# Patient Record
Sex: FEMALE | Race: OTHER | Employment: UNEMPLOYED | URBAN - METROPOLITAN AREA
[De-identification: names, ages, dates, MRNs, and addresses within clinical notes are randomized per-mention and may not be internally consistent; named-entity substitution may affect disease eponyms.]

---

## 2017-02-06 ENCOUNTER — INITIAL PRENATAL (OUTPATIENT)
Dept: MIDWIFE SERVICES | Age: 32
End: 2017-02-06

## 2017-02-06 ENCOUNTER — TELEPHONE (OUTPATIENT)
Dept: MIDWIFE SERVICES | Age: 32
End: 2017-02-06

## 2017-02-06 VITALS
WEIGHT: 187 LBS | TEMPERATURE: 98 F | BODY MASS INDEX: 34.41 KG/M2 | DIASTOLIC BLOOD PRESSURE: 79 MMHG | HEART RATE: 79 BPM | SYSTOLIC BLOOD PRESSURE: 124 MMHG | HEIGHT: 62 IN

## 2017-02-06 DIAGNOSIS — O09.892 SUPERVISION OF OTHER HIGH RISK PREGNANCY, ANTEPARTUM, SECOND TRIMESTER: Primary | ICD-10-CM

## 2017-02-06 DIAGNOSIS — Z98.891 HX OF CESAREAN SECTION: ICD-10-CM

## 2017-02-06 DIAGNOSIS — Z3A.13 13 WEEKS GESTATION OF PREGNANCY: ICD-10-CM

## 2017-02-06 PROBLEM — Q61.3 KIDNEY POLYCYSTIC DISEASE: Status: ACTIVE | Noted: 2017-02-06

## 2017-02-06 PROBLEM — O09.899 SUPERVISION OF OTHER HIGH RISK PREGNANCY, ANTEPARTUM: Status: ACTIVE | Noted: 2017-02-06

## 2017-02-06 LAB
BILIRUB UR QL STRIP: NEGATIVE
GLUCOSE UR-MCNC: NEGATIVE MG/DL
HCG URINE, QL. (POC): POSITIVE
KETONES P FAST UR STRIP-MCNC: NEGATIVE MG/DL
PH UR STRIP: 6 [PH] (ref 4.6–8)
PROT UR QL STRIP: NEGATIVE MG/DL
SP GR UR STRIP: 1.02 (ref 1–1.03)
UA UROBILINOGEN AMB POC: NORMAL (ref 0.2–1)
URINALYSIS CLARITY POC: CLEAR
URINALYSIS COLOR POC: YELLOW
URINE BLOOD POC: NORMAL
URINE LEUKOCYTES POC: NEGATIVE
URINE NITRITES POC: NEGATIVE
VALID INTERNAL CONTROL?: YES

## 2017-02-06 NOTE — TELEPHONE ENCOUNTER
Pt was going to buy a 936IYM folic acid to take but the pharmacist informed her that her prenatal vitamin already has 1mg of folic acid in it. Pt advised that should be adequate.

## 2017-02-06 NOTE — TELEPHONE ENCOUNTER
Pt calling stating the pharmacist stated the amount of folic acid pt is being told to take is too much and she would like to discuss it. Please call.

## 2017-02-06 NOTE — PROGRESS NOTES
Chief Complaint   Patient presents with    Initial Prenatal Visit     Visit Vitals    /79    Pulse 79    Temp 98 °F (36.7 °C) (Oral)    Ht 5' 2\" (1.575 m)    Wt 187 lb (84.8 kg)    Breastfeeding No    BMI 34.2 kg/m2     1. Have you been to the ER, urgent care clinic since your last visit? Hospitalized since your last visit? No    2. Have you seen or consulted any other health care providers outside of the [address] [place] [address] since your last visit? Include any pap smears or colon screening.  No    Verbal order per Dr Megan Ascencio for urine pregnancy, urine dip, urine culture & G/C.

## 2017-02-06 NOTE — PROGRESS NOTES
RETURN OB VISIT SUMMARY    Subjective:    28 y.o.    13w1d with has no unusual complaints, Denies LOF, dysuria, bleeding or cramping. Reports normal fetal movement. She istolerating her diet and is taking PNV on a regular basis    Objective:    Physical Exam:  Visit Vitals    /79    Pulse 79    Temp 98 °F (36.7 °C) (Oral)    Ht 5' 2\" (1.575 m)    Wt 187 lb (84.8 kg)    LMP 2016    Breastfeeding No    BMI 34.2 kg/m2     Also, see prenatal vitals flowsheet. Patient without distress. Fundus: soft and non tender    28 wk Labs:  deferred  Lab Results   Component Value Date/Time    HGB 12.3 2015 11:01 AM    HCT 37.6 2015 11:01 AM    PLATELET 527  11:01 AM    ABO Group O 2015 11:01 AM    Rh (D) Positive 2015 11:01 AM    Antibody screen Negative 2015 11:01 AM    Chlamydia trachomatis, BRADLEY Negative 2015 10:20 AM    Neisseria gonorrhoeae, BRADLEY Negative 2015 10:20 AM    Hep B surface Ag screen Negative 2015 11:01 AM       Immunization History   Administered Date(s) Administered    Influenza Vaccine (Quad) 2015       Flu Shot: Pregnancy will continue into the flu season. Flu shot: declined    Assessment/Plan:    Linda Mays was seen today for initial prenatal visit.     Diagnoses and all orders for this visit:    Supervision of other high risk pregnancy, antepartum, second trimester  -     HEP B SURFACE AG  -     T PALLIDUM SCREEN W/REFLEX  -     RUBELLA AB, IGG  -     TYPE & SCREEN  -     HIV 1/2 AG/AB, 4TH GENERATION,W RFLX CONFIRM  -     CBC WITH AUTOMATED DIFF  -     THYROID CASCADE PROFILE  -     CYSTIC FIBROSIS MUTATION 97  -     VITAMIN D, 25 HYDROXY  -     HEMOGLOBIN FRACTIONATION  -     CMV AB, IGG  -     CMV AB, IGM  -     AMB POC URINE PREGNANCY TEST, VISUAL COLOR COMPARISON  -     AMB POC URINALYSIS DIP STICK MANUAL W/O MICRO  -     CULTURE, URINE  -     CHLAMYDIA / GC AMPLIFICATION    Hx of  section    13 weeks gestation of pregnancy    Other orders  -     prenatal vit-calcium-iron-fa (PRENATAL PLUS WITH CALCIUM) 27 mg iron- 1 mg tab; Take 1 Tab by mouth daily. Follow-up Disposition:  Return in about 4 weeks (around 3/6/2017) for HROB for, previous C/S and poor ob hx. OB Report [facility] Page 1/2 [facility]  Patient / Exam Information Date of Exam: 2017  Name: Audra Hanna. Phys: Carlo GUY  Patient ID: [id number] : 1985 Ref. Phys:  2nd Pat. ID: Age: 28 Sonographer: Carlo GUY  Indication: Sex: Female Exam Type: DATING  LMP: 2016  DOC:  GA(LMP) 13w1d MIQUEL(LMP) 2017 Grav: 4 Para: 1 Ab: 2 Ect.:  GA(AUA) 12w4d MIQUEL(AUA) 2017  2D Measurements AUA Value m1 m2 m3 Meth. GP GA  CRL ([place]) 5.95 cm 6.03 5.98 5.85 avg. 7.3% 12w4d  Doppler Measurements Value m1 m2 m3 m4 m5 m6 Meth. A Fetal Heart Rate   bpm 161 157 160 avg. IMPRESSION  SIUP consistent with dates, positive Fetal cardiac activity 159 bpm  Recommendations  Rescan as clinically indicated  Date: 2017 Perf.  Physician: Carlo GUY Sonographer: Carlo GUY

## 2017-02-06 NOTE — MR AVS SNAPSHOT
Visit Information Date & Time Provider Department Dept. Phone Encounter #  
 2017 10:00 AM Erica Chavez, [address] [facility] [phone] [id number] Upcoming Health Maintenance Date Due INFLUENZA AGE 9 TO ADULT 2016 PAP AKA CERVICAL CYTOLOGY 2018 Allergies as of 2017  Review Complete On: 2017 By: Erica Chavez MD  
 No Known Allergies Current Immunizations  Never Reviewed Name Date Influenza Vaccine Tone Ram) 2015 Not reviewed this visit You Were Diagnosed With   
  
 Codes Comments Supervision of other high risk pregnancy, antepartum, second trimester    -  Primary ICD-10-CM: G64.536 ICD-9-CM: V23.89 Hx of  section     ICD-10-CM: H69.852 ICD-9-CM: V45.89 Vitals BP Pulse Temp Height(growth percentile) Weight(growth percentile) LMP  
 124/79 79 98 °F (36.7 °C) (Oral) 5' 2\" (1.575 m) 187 lb (84.8 kg) 2016 Breastfeeding? BMI OB Status Smoking Status No 34.2 kg/m2 Having regular periods Never Smoker BMI and BSA Data Body Mass Index Body Surface Area  
 34.2 kg/m 2 1.93 m 2 Preferred Pharmacy Pharmacy Name Phone [facility] [address] [facility] [phone] Your Updated Medication List  
  
   
This list is accurate as of: 17 11:02 AM.  Always use your most recent med list.  
  
  
  
  
 prenatal multivit-ca-min-fe-fa Tab Take  by mouth.  
  
 prenatal vit-calcium-iron-fa 27 mg iron- 1 mg Tab Commonly known as:  PRENATAL PLUS with CALCIUM Take 1 Tab by mouth daily. Prescriptions Sent to Pharmacy Refills  
 prenatal vit-calcium-iron-fa (PRENATAL PLUS WITH CALCIUM) 27 mg iron- 1 mg tab 4 Sig: Take 1 Tab by mouth daily. Class: Normal  
 [facility] [address] [facility] #: [phone] Route: Oral  
  
We Performed the Following CBC WITH AUTOMATED DIFF [17776 CPT(R)] CMV AB, IGG [08164 CPT(R)] CMV AB, IGM [18223 CPT(R)] CYSTIC FIBROSIS MUTATION 97 [[id number] Custom] HEMOGLOBIN FRACTIONATION [[id number] Custom] HEP B SURFACE AG T0464428 CPT(R)] HIV 1/2 AG/AB, 4TH GENERATION,W RFLX CONFIRM [[id number] Custom] RUBELLA AB, IGG B0273661 CPT(R)] T PALLIDUM SCREEN W/REFLEX [[id number] Custom] THYROID CASCADE PROFILE [[id number] Custom] TYPE & SCREEN [[id number] Custom] Comments:  
 ENTER SURGERY DATE IF FOR PRE-OP TESTING. VITAMIN D, 25 HYDROXY G3669222 CPT(R)] Introducing [facility] SERVICES! Nicolasa Pedroza introduces [employer] patient portal. Now you can access parts of your medical record, email your doctor's office, and request medication refills online. 1. In your internet browser, go to [email or web address] 2. Click on the First Time User? Click Here link in the Sign In box. You will see the New Member Sign Up page. 3. Enter your [employer] Access Code exactly as it appears below. You will not need to use this code after youve completed the sign-up process. If you do not sign up before the expiration date, you must request a new code. · [employer] Access Code: [email or web address] Expires: 5/7/2017 10:51 AM 
 
4. Enter the last four digits of your Social Security Number (xxxx) and Date of Birth (mm/dd/yyyy) as indicated and click Submit. You will be taken to the next sign-up page. 5. Create a [employer] ID. This will be your [employer] login ID and cannot be changed, so think of one that is secure and easy to remember. 6. Create a [employer] password. You can change your password at any time. 7. Enter your Password Reset Question and Answer. This can be used at a later time if you forget your password. 8. Enter your e-mail address. You will receive e-mail notification when new information is available in [address] 9. Click Sign Up. You can now view and download portions of your medical record. 10. Click the Download Summary menu link to download a portable copy of your medical information. If you have questions, please visit the Frequently Asked Questions section of the [employer] website. Remember, [employer] is NOT to be used for urgent needs. For medical emergencies, dial 911. Now available from your iPhone and Android! Please provide this summary of care documentation to your next provider. Your primary care clinician is listed as MARINA TURCIOS. If you have any questions after today's visit, please call [phone].

## 2017-02-07 LAB — BACTERIA UR CULT: NO GROWTH

## 2017-02-08 LAB
C TRACH RRNA SPEC QL NAA+PROBE: NEGATIVE
CHLAMYDIA, EXTERNAL: NEGATIVE
N GONORRHOEA RRNA SPEC QL NAA+PROBE: NEGATIVE
N. GONORRHEA, EXTERNAL: NEGATIVE

## 2017-02-09 ENCOUNTER — HOSPITAL ENCOUNTER (OUTPATIENT)
Dept: PERINATAL CARE | Age: 32
Discharge: HOME | End: 2017-02-09
Payer: COMMERCIAL

## 2017-02-09 PROCEDURE — 76801 OB US < 14 WKS SINGLE FETUS: CPT | Performed by: OBSTETRICS & GYNECOLOGY

## 2017-02-09 PROCEDURE — 76813 OB US NUCHAL MEAS 1 GEST: CPT | Performed by: OBSTETRICS & GYNECOLOGY

## 2017-02-09 PROCEDURE — 36416 COLLJ CAPILLARY BLOOD SPEC: CPT | Performed by: OBSTETRICS & GYNECOLOGY

## 2017-02-14 ENCOUNTER — TELEPHONE (OUTPATIENT)
Dept: PERINATAL CARE | Age: 32
End: 2017-02-14

## 2017-02-15 LAB
25(OH)D3+25(OH)D2 SERPL-MCNC: 26.7 NG/ML (ref 30–100)
ABO GROUP BLD: NORMAL
BASOPHILS # BLD AUTO: 0 X10E3/UL (ref 0–0.2)
BASOPHILS NFR BLD AUTO: 0 %
BLD GP AB SCN SERPL QL: NEGATIVE
CFTR MUT ANL BLD/T: NORMAL
CMV IGG SERPL IA-ACNC: 5.7 U/ML (ref 0–0.59)
CMV IGM SERPL IA-ACNC: <30 AU/ML (ref 0–29.9)
EOSINOPHIL # BLD AUTO: 0.1 X10E3/UL (ref 0–0.4)
EOSINOPHIL NFR BLD AUTO: 1 %
ERYTHROCYTE [DISTWIDTH] IN BLOOD BY AUTOMATED COUNT: 14.4 % (ref 12.3–15.4)
GENE DIS ANL CARRIER INTERP-IMP: NORMAL
HBSAG, EXTERNAL: NEGATIVE
HBV SURFACE AG SERPL QL IA: NEGATIVE
HCT VFR BLD AUTO: 35.2 % (ref 34–46.6)
HGB A MFR BLD: 56.9 % (ref 94–98)
HGB A2 MFR BLD COLUMN CHROM: 4 % (ref 0.7–3.1)
HGB BLD-MCNC: 11.7 G/DL (ref 11.1–15.9)
HGB C MFR BLD: 0 %
HGB F MFR BLD: 1 % (ref 0–2)
HGB FRACT BLD-IMP: ABNORMAL
HGB S BLD QL SOLY: POSITIVE
HGB S MFR BLD: 38.1 %
HIV 1+2 AB+HIV1 P24 AG SERPL QL IA: NON REACTIVE
HIV, EXTERNAL: NONREACTIVE
IMM GRANULOCYTES # BLD: 0 X10E3/UL (ref 0–0.1)
IMM GRANULOCYTES NFR BLD: 0 %
LYMPHOCYTES # BLD AUTO: 1.6 X10E3/UL (ref 0.7–3.1)
LYMPHOCYTES NFR BLD AUTO: 18 %
MCH RBC QN AUTO: 28.8 PG (ref 26.6–33)
MCHC RBC AUTO-ENTMCNC: 33.2 G/DL (ref 31.5–35.7)
MCV RBC AUTO: 87 FL (ref 79–97)
MONOCYTES # BLD AUTO: 0.3 X10E3/UL (ref 0.1–0.9)
MONOCYTES NFR BLD AUTO: 4 %
NEUTROPHILS # BLD AUTO: 6.7 X10E3/UL (ref 1.4–7)
NEUTROPHILS NFR BLD AUTO: 77 %
PLATELET # BLD AUTO: 304 X10E3/UL (ref 150–379)
RBC # BLD AUTO: 4.06 X10E6/UL (ref 3.77–5.28)
RH BLD: POSITIVE
RUBELLA, EXTERNAL: NORMAL
RUBV IGG SERPL IA-ACNC: 2.38 INDEX
T PALLIDUM AB SER QL IA: NEGATIVE
T. PALLIDUM, EXTERNAL: NEGATIVE
TSH SERPL DL<=0.005 MIU/L-ACNC: 0.68 UIU/ML (ref 0.45–4.5)
TYPE, ABO & RH, EXTERNAL: NORMAL
WBC # BLD AUTO: 8.7 X10E3/UL (ref 3.4–10.8)

## 2017-02-28 NOTE — PROGRESS NOTES
Sickle cell trait  FOB needs testing (Hgb fractionation) - can probably be tested at [facility] if no insurance

## 2017-02-28 NOTE — PROGRESS NOTES
Recommendations: 1. Consider ONTD screening (MSAFP) during the second trimester, if the patient desires. 2. Preliminary fetal anatomy survey in about 4 weeks. Ms. Jose Ferrari will consider genetic counseling at that time. 3. Recommend a detailed fetal anatomy scan at 19-20 weeks.

## 2017-03-01 NOTE — PROGRESS NOTES
Unable to reach patient by phone. i see an appointment for this Monday, so we can discuss results at that time.

## 2017-03-06 ENCOUNTER — ROUTINE PRENATAL (OUTPATIENT)
Dept: MIDWIFE SERVICES | Age: 32
End: 2017-03-06

## 2017-03-06 VITALS
DIASTOLIC BLOOD PRESSURE: 82 MMHG | SYSTOLIC BLOOD PRESSURE: 123 MMHG | WEIGHT: 185 LBS | HEIGHT: 62 IN | HEART RATE: 93 BPM | BODY MASS INDEX: 34.04 KG/M2

## 2017-03-06 DIAGNOSIS — Q61.3 KIDNEY POLYCYSTIC DISEASE: ICD-10-CM

## 2017-03-06 DIAGNOSIS — Z3A.17 17 WEEKS GESTATION OF PREGNANCY: ICD-10-CM

## 2017-03-06 DIAGNOSIS — O09.892 SUPERVISION OF OTHER HIGH RISK PREGNANCY, ANTEPARTUM, SECOND TRIMESTER: Primary | ICD-10-CM

## 2017-03-06 DIAGNOSIS — Z98.891 HX OF CESAREAN SECTION: ICD-10-CM

## 2017-03-06 NOTE — PROGRESS NOTES
Chief Complaint   Patient presents with    Routine Prenatal Visit     17w1d     Visit Vitals    /82    Pulse 93    Ht 5' 2\" (1.575 m)    Wt 185 lb (83.9 kg)    BMI 33.84 kg/m2       1. Have you been to the ER, urgent care clinic since your last visit? Hospitalized since your last visit? No    2. Have you seen or consulted any other health care providers outside of the [address] [place] [address] since your last visit? Include any pap smears or colon screening. No     Here today for a routine prenatal visit and she has no complaints or concerns.

## 2017-03-06 NOTE — PROGRESS NOTES
RETURN OB VISIT SUMMARY    Subjective:    28 y.o.    17w1d with has no unusual complaints, Denies LOF, dysuria, bleeding or cramping. Reports normal fetal movement. She istolerating her diet and is taking PNV on a regular basis. She has added Vit D supplement. Need to send all labs to [facility] due to patient's insurance  Objective:    Physical Exam:  Visit Vitals    /82    Pulse 93    Ht 5' 2\" (1.575 m)    Wt 185 lb (83.9 kg)    LMP 2016    BMI 33.84 kg/m2     Also, see prenatal vitals flowsheet. Patient without distress. Fundus: soft and non tender    28 wk Labs:  discussed  Lab Results   Component Value Date/Time    HGB 11.7 2017 11:17 AM    HCT 35.2 2017 11:17 AM    PLATELET 603  11:17 AM    ABO Group O 2017 11:17 AM    Rh (D) Positive 2017 11:17 AM    Antibody screen Negative 2017 11:17 AM    Chlamydia trachomatis, BRADLEY Negative 2017 11:53 AM    Neisseria gonorrhoeae, BRADLEY Negative 2017 11:53 AM    Hep B surface Ag screen Negative 2017 11:17 AM    HIV SCREEN 4TH GENERATION WRFX Non Reactive 2017 11:17 AM       Immunization History   Administered Date(s) Administered    Influenza Vaccine (Quad) 2015       Flu Shot: Pregnancy will continue into the flu season. Flu shot: declined and due to late in the flu season    Assessment/Plan:    Jorge Maher was seen today for routine prenatal visit.     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  FOB previously tested negative for SS trait. Family's first child has trait.

## 2017-03-06 NOTE — MR AVS SNAPSHOT
Visit Information Date & Time Provider Department Dept. Phone Encounter #  
 3/6/2017 10:40 AM Guillermina Sutton MD [place] [facility] [address] [id number] Upcoming Health Maintenance Date Due INFLUENZA AGE 9 TO ADULT 2016 PAP AKA CERVICAL CYTOLOGY 2018 Allergies as of 3/6/2017  Review Complete On: 3/6/2017 By: Guillermina Sutton MD  
 No Known Allergies Current Immunizations  Never Reviewed Name Date Influenza Vaccine Rajielvia Mora) 2015 Not reviewed this visit You Were Diagnosed With   
  
 Codes Comments Supervision of other high risk pregnancy, antepartum, second trimester    -  Primary ICD-10-CM: F39.319 ICD-9-CM: V23.89 Hx of  section     ICD-10-CM: N11.587 ICD-9-CM: V45.89 Kidney polycystic disease     ICD-10-CM: Q61.3 ICD-9-CM: 753.12   
 17 weeks gestation of pregnancy     ICD-10-CM: Z3A.17 
ICD-9-CM: V22.2 Vitals BP Pulse Height(growth percentile) Weight(growth percentile) LMP BMI  
 123/82 93 5' 2\" (1.575 m) 185 lb (83.9 kg) 2016 33.84 kg/m2 OB Status Smoking Status Having regular periods Never Smoker Vitals History BMI and BSA Data Body Mass Index Body Surface Area  
 33.84 kg/m 2 1.92 m 2 Preferred Pharmacy Pharmacy Name Phone [facility] [address] [facility] [phone] Your Updated Medication List  
  
   
This list is accurate as of: 3/6/17 11:22 AM.  Always use your most recent med list.  
  
  
  
  
 prenatal vit-calcium-iron-fa 27 mg iron- 1 mg Tab Commonly known as:  PRENATAL PLUS with CALCIUM Take 1 Tab by mouth daily. We Performed the Following   
 AFP, MATERNAL SCREEN [39630 CPT(R)] To-Do List   
 2017 10:15 AM  
  Appointment with ULTRASOUND 3 SFM at [facility] ([phone]) [facility] [place] [facility] SERVICES! Romayne Duster introduces [employer] patient portal. Now you can access parts of your medical record, email your doctor's office, and request medication refills online. 1. In your internet browser, go to [email or web address] 2. Click on the First Time User? Click Here link in the Sign In box. You will see the New Member Sign Up page. 3. Enter your [employer] Access Code exactly as it appears below. You will not need to use this code after youve completed the sign-up process. If you do not sign up before the expiration date, you must request a new code. · [employer] Access Code: [email or web address] Expires: 5/7/2017 10:51 AM 
 
4. Enter the last four digits of your Social Security Number (xxxx) and Date of Birth (mm/dd/yyyy) as indicated and click Submit. You will be taken to the next sign-up page. 5. Create a [employer] ID. This will be your [employer] login ID and cannot be changed, so think of one that is secure and easy to remember. 6. Create a [employer] password. You can change your password at any time. 7. Enter your Password Reset Question and Answer. This can be used at a later time if you forget your password. 8. Enter your e-mail address. You will receive e-mail notification when new information is available in [address] 9. Click Sign Up. You can now view and download portions of your medical record. 10. Click the Download Summary menu link to download a portable copy of your medical information. If you have questions, please visit the Frequently Asked Questions section of the [employer] website. Remember, [employer] is NOT to be used for urgent needs. For medical emergencies, dial 911. Now available from your iPhone and Android! Please provide this summary of care documentation to your next provider. Your primary care clinician is listed as Christie Martin. If you have any questions after today's visit, please call [phone].

## 2017-03-07 ENCOUNTER — HOSPITAL ENCOUNTER (OUTPATIENT)
Dept: PERINATAL CARE | Age: 32
Discharge: HOME | End: 2017-03-07
Attending: OBSTETRICS & GYNECOLOGY
Payer: COMMERCIAL

## 2017-03-07 PROCEDURE — 76816 OB US FOLLOW-UP PER FETUS: CPT | Performed by: OBSTETRICS & GYNECOLOGY

## 2017-03-20 LAB
AFP, SERUM, 141303: NORMAL
Lab: NORMAL
MOM AFP, 7036: 0.77

## 2017-04-03 ENCOUNTER — ROUTINE PRENATAL (OUTPATIENT)
Dept: MIDWIFE SERVICES | Age: 32
End: 2017-04-03

## 2017-04-03 VITALS
SYSTOLIC BLOOD PRESSURE: 119 MMHG | HEART RATE: 84 BPM | HEIGHT: 62 IN | WEIGHT: 185.5 LBS | DIASTOLIC BLOOD PRESSURE: 72 MMHG | BODY MASS INDEX: 34.14 KG/M2

## 2017-04-03 DIAGNOSIS — Z3A.21 21 WEEKS GESTATION OF PREGNANCY: ICD-10-CM

## 2017-04-03 DIAGNOSIS — O09.892 SUPERVISION OF OTHER HIGH RISK PREGNANCY, ANTEPARTUM, SECOND TRIMESTER: Primary | ICD-10-CM

## 2017-04-03 DIAGNOSIS — Z98.891 HX OF CESAREAN SECTION: ICD-10-CM

## 2017-04-03 RX ORDER — CHOLECALCIFEROL (VITAMIN D3) 125 MCG
CAPSULE ORAL
COMMUNITY

## 2017-04-03 NOTE — PROGRESS NOTES
Chief Complaint   Patient presents with    Routine Prenatal Visit     21w 1d     Visit Vitals    /72    Pulse 84    Ht 5' 2\" (1.575 m)    Wt 185 lb 8 oz (84.1 kg)    LMP 11/06/2016    BMI 33.93 kg/m2     1. Have you been to the ER, urgent care clinic since your last visit? Hospitalized since your last visit? No    2. Have you seen or consulted any other health care providers outside of the [address] [place] [address] since your last visit? Include any pap smears or colon screening.  No

## 2017-04-03 NOTE — MR AVS SNAPSHOT
Visit Information Date & Time Provider Department Dept. Phone Encounter #  
 4/3/2017 10:40 AM Tiny Coleman MD [address] [facility] [phone] [id number] Follow-up Instructions Return in about 4 weeks (around 2017) for HROB for, previous C/S. Upcoming Health Maintenance Date Due INFLUENZA AGE 9 TO ADULT 2016 PAP AKA CERVICAL CYTOLOGY 2018 Allergies as of 4/3/2017  Review Complete On: 4/3/2017 By: Tiny Coleman MD  
 No Known Allergies Current Immunizations  Never Reviewed Name Date Influenza Vaccine Levester Bloch) 2015 Not reviewed this visit You Were Diagnosed With   
  
 Codes Comments Supervision of other high risk pregnancy, antepartum, second trimester    -  Primary ICD-10-CM: X45.072 ICD-9-CM: V23.89 Hx of  section     ICD-10-CM: S28.211 ICD-9-CM: V45.89   
 21 weeks gestation of pregnancy     ICD-10-CM: Z3A.21 
ICD-9-CM: V22.2 Vitals BP Pulse Height(growth percentile) Weight(growth percentile) LMP BMI  
 119/72 84 5' 2\" (1.575 m) 185 lb 8 oz (84.1 kg) 2016 33.93 kg/m2 OB Status Smoking Status Having regular periods Never Smoker BMI and BSA Data Body Mass Index Body Surface Area  
 33.93 kg/m 2 1.92 m 2 Preferred Pharmacy Pharmacy Name Phone [facility] [address] [place] [address] [phone] Your Updated Medication List  
  
   
This list is accurate as of: 4/3/17 11:12 AM.  Always use your most recent med list.  
  
  
  
  
 prenatal vit-calcium-iron-fa 27 mg iron- 1 mg Tab Commonly known as:  PRENATAL PLUS with CALCIUM Take 1 Tab by mouth daily. VITAMIN D3 2,000 unit Tab Generic drug:  cholecalciferol (vitamin D3) Take  by mouth. Follow-up Instructions Return in about 4 weeks (around 2017) for HROB for, previous C/S. To-Do List   
 2017 10:15 AM  
 Appointment with ULTRASOUND 3 SFM at [facility] ([phone]) Introducing [facility] & HEALTH SERVICES! Radha Nix introduces [employer] patient portal. Now you can access parts of your medical record, email your doctor's office, and request medication refills online. 1. In your internet browser, go to [email or web address] 2. Click on the First Time User? Click Here link in the Sign In box. You will see the New Member Sign Up page. 3. Enter your Headspace Access Code exactly as it appears below. You will not need to use this code after youve completed the sign-up process. If you do not sign up before the expiration date, you must request a new code. · Headspace Access Code: [email or web address] Expires: 5/7/2017 11:51 AM 
 
4. Enter the last four digits of your Social Security Number (xxxx) and Date of Birth (mm/dd/yyyy) as indicated and click Submit. You will be taken to the next sign-up page. 5. Create a Headspace ID. This will be your Headspace login ID and cannot be changed, so think of one that is secure and easy to remember. 6. Create a Headspace password. You can change your password at any time. 7. Enter your Password Reset Question and Answer. This can be used at a later time if you forget your password. 8. Enter your e-mail address. You will receive e-mail notification when new information is available in [address] 9. Click Sign Up. You can now view and download portions of your medical record. 10. Click the Download Summary menu link to download a portable copy of your medical information. If you have questions, please visit the Frequently Asked Questions section of the Head[employer] website. Remember, Head[employer] is NOT to be used for urgent needs. For medical emergencies, dial 911. Now available from your iPhone and Android! Please provide this summary of care documentation to your next provider. Your primary care clinician is listed as Gita Cartagena. If you have any questions after today's visit, please call [phone].

## 2017-04-03 NOTE — PROGRESS NOTES
RETURN OB VISIT SUMMARY    Subjective:    28 y.o.    21w1d with has no unusual complaints, Denies LOF, dysuria, bleeding or cramping. Reports normal fetal movement. She istolerating her diet and is taking PNV on a regular basis. We discussed US results and her questions were answered  Objective:    Physical Exam:  Visit Vitals    /72    Pulse 84    Ht 5' 2\" (1.575 m)    Wt 185 lb 8 oz (84.1 kg)    LMP 2016    BMI 33.93 kg/m2     Also, see prenatal vitals flowsheet. Patient without distress. Fundus: soft and non tender    28 wk Labs:  discussed  Lab Results   Component Value Date/Time    HGB 11.7 2017 11:17 AM    HCT 35.2 2017 11:17 AM    PLATELET 898 52/10/8310 11:17 AM    ABO Group O 2017 11:17 AM    Rh (D) Positive 2017 11:17 AM    Antibody screen Negative 2017 11:17 AM    Chlamydia trachomatis, BRADLEY Negative 2017 11:53 AM    Neisseria gonorrhoeae, BRADLEY Negative 2017 11:53 AM    Hep B surface Ag screen Negative 2017 11:17 AM    HIV SCREEN 4TH GENERATION WRFX Non Reactive 2017 11:17 AM       Immunization History   Administered Date(s) Administered    Influenza Vaccine (Quad) 2015       Flu Shot: Pregnancy will not continue into the flu season. Flu shot: discussed    Assessment/Plan:    Zan Fountain was seen today for routine prenatal visit. Diagnoses and all orders for this visit:    Supervision of other high risk pregnancy, antepartum, second trimester    Hx of  section    21 weeks gestation of pregnancy      Follow-up Disposition:  Return in about 4 weeks (around 2017) for HROB for, previous C/S.

## 2017-04-04 ENCOUNTER — HOSPITAL ENCOUNTER (OUTPATIENT)
Dept: PERINATAL CARE | Age: 32
Discharge: HOME | End: 2017-04-04
Attending: OBSTETRICS & GYNECOLOGY
Payer: COMMERCIAL

## 2017-04-04 PROCEDURE — 76811 OB US DETAILED SNGL FETUS: CPT | Performed by: OBSTETRICS & GYNECOLOGY

## 2017-05-01 ENCOUNTER — ROUTINE PRENATAL (OUTPATIENT)
Dept: MIDWIFE SERVICES | Age: 32
End: 2017-05-01

## 2017-05-01 VITALS
SYSTOLIC BLOOD PRESSURE: 106 MMHG | HEART RATE: 85 BPM | BODY MASS INDEX: 34.6 KG/M2 | WEIGHT: 188 LBS | HEIGHT: 62 IN | DIASTOLIC BLOOD PRESSURE: 64 MMHG

## 2017-05-01 DIAGNOSIS — Z3A.25 25 WEEKS GESTATION OF PREGNANCY: ICD-10-CM

## 2017-05-01 DIAGNOSIS — O09.892 SUPERVISION OF OTHER HIGH RISK PREGNANCY, ANTEPARTUM, SECOND TRIMESTER: Primary | ICD-10-CM

## 2017-05-01 DIAGNOSIS — Z98.891 HX OF CESAREAN SECTION: ICD-10-CM

## 2017-05-01 NOTE — PROGRESS NOTES
Chief Complaint   Patient presents with    Routine Prenatal Visit     25w1d     Visit Vitals    /64    Pulse 85    Ht 5' 2\" (1.575 m)    Wt 188 lb (85.3 kg)    BMI 34.39 kg/m2       1. Have you been to the ER, urgent care clinic since your last visit? Hospitalized since your last visit? No    2. Have you seen or consulted any other health care providers outside of the [address] [place] [address] since your last visit? Include any pap smears or colon screening. No     Here today for a routine prenatal visit and she has no complaints or concerns.

## 2017-05-01 NOTE — PROGRESS NOTES
RETURN OB VISIT SUMMARY    Subjective:    28 y.o.    25w1d with has no unusual complaints, Denies LOF, dysuria, bleeding or cramping. Reports normal fetal movement. She istolerating her diet and is taking PNV on a regular basis. She has not noted a change in fetal position. Has repeat scan for renal follow up of fetus due to prior pregnancy with polycystic kidney disease    Objective:    Physical Exam:  Visit Vitals    /64    Pulse 85    Ht 5' 2\" (1.575 m)    Wt 188 lb (85.3 kg)    LMP 2016    BMI 34.39 kg/m2     Also, see prenatal vitals flowsheet. Patient without distress. Fundus: soft and non tender    28 wk Labs:  given  Lab Results   Component Value Date/Time    HGB 11.7 2017 11:17 AM    HCT 35.2 2017 11:17 AM    PLATELET 388  11:17 AM    ABO Group O 2017 11:17 AM    Rh (D) Positive 2017 11:17 AM    Antibody screen Negative 2017 11:17 AM    Chlamydia trachomatis, BRADLEY Negative 2017 11:53 AM    Neisseria gonorrhoeae, BRADLEY Negative 2017 11:53 AM    Hep B surface Ag screen Negative 2017 11:17 AM    HIV SCREEN 4TH GENERATION WRFX Non Reactive 2017 11:17 AM       Immunization History   Administered Date(s) Administered    Influenza Vaccine (Quad) 2015       Assessment/Plan:    Zan Fountain was seen today for routine prenatal visit. Diagnoses and all orders for this visit:    Supervision of other high risk pregnancy, antepartum, second trimester  -     Cancel: HEMOGLOBIN  -     Cancel: HEMATOCRIT  -     Cancel: PLATELET  -     Cancel: ANTIBODY SCREEN  -     Cancel: GESTATIONAL ([address] SCRN  -     HEMOGLOBIN  -     HEMATOCRIT  -     PLATELET  -     ANTIBODY SCREEN  -     GESTATIONAL (GLUCOSE)DIAB. SCRN    Hx of  section    25 weeks gestation of pregnancy      Follow-up Disposition:  Return in about 3 weeks (around 2017) for HROB for, previous C/S.

## 2017-05-02 ENCOUNTER — HOSPITAL ENCOUNTER (OUTPATIENT)
Dept: PERINATAL CARE | Age: 32
Discharge: HOME | End: 2017-05-02
Attending: OBSTETRICS & GYNECOLOGY
Payer: COMMERCIAL

## 2017-05-02 PROCEDURE — 76816 OB US FOLLOW-UP PER FETUS: CPT | Performed by: OBSTETRICS & GYNECOLOGY

## 2017-05-22 ENCOUNTER — ROUTINE PRENATAL (OUTPATIENT)
Dept: MIDWIFE SERVICES | Age: 32
End: 2017-05-22

## 2017-05-22 VITALS
BODY MASS INDEX: 34.96 KG/M2 | SYSTOLIC BLOOD PRESSURE: 109 MMHG | HEART RATE: 88 BPM | WEIGHT: 190 LBS | HEIGHT: 62 IN | DIASTOLIC BLOOD PRESSURE: 70 MMHG

## 2017-05-22 DIAGNOSIS — Z3A.28 28 WEEKS GESTATION OF PREGNANCY: Primary | ICD-10-CM

## 2017-05-22 DIAGNOSIS — Z98.891 HX OF CESAREAN SECTION: ICD-10-CM

## 2017-05-22 DIAGNOSIS — O09.893 SUPERVISION OF OTHER HIGH RISK PREGNANCY, ANTEPARTUM, THIRD TRIMESTER: ICD-10-CM

## 2017-05-22 DIAGNOSIS — R89.9 ABNORMAL LABORATORY TEST RESULT: ICD-10-CM

## 2017-05-22 NOTE — PROGRESS NOTES
Chief Complaint   Patient presents with    Routine Prenatal Visit     28w1d     Visit Vitals    /70    Pulse 88    Ht 5' 2\" (1.575 m)    Wt 190 lb (86.2 kg)    BMI 34.75 kg/m2       1. Have you been to the ER, urgent care clinic since your last visit? Hospitalized since your last visit? No    2. Have you seen or consulted any other health care providers outside of the [facility] since your last visit? Include any pap smears or colon screening. No     Here today for a routine prenatal visit and she has no complaints or concerns.

## 2017-05-22 NOTE — PROGRESS NOTES
RETURN OB VISIT SUMMARY    Subjective:    28 y.o.    28w1d with has no unusual complaints, Denies LOF, dysuria, bleeding or cramping. Reports normal fetal movement. She istolerating her diet and is taking PNV on a regular basis. She has not noted a change in fetal position. Objective:    Physical Exam:  Visit Vitals    /70    Pulse 88    Ht 5' 2\" (1.575 m)    Wt 190 lb (86.2 kg)    LMP 2016    BMI 34.75 kg/m2     Also, see prenatal vitals flowsheet. Patient without distress. Fundus: soft and non tender    Lab Results   Component Value Date/Time    HGB 11.7 2017 11:17 AM    HCT 35.2 2017 11:17 AM    PLATELET 233  11:17 AM    ABO Group O 2017 11:17 AM    Rh (D) Positive 2017 11:17 AM    Antibody screen Negative 2017 11:17 AM    Chlamydia trachomatis, BRADLEY Negative 2017 11:53 AM    Neisseria gonorrhoeae, BRADLEY Negative 2017 11:53 AM    Hep B surface Ag screen Negative 2017 11:17 AM    HIV SCREEN 4TH GENERATION WRFX Non Reactive 2017 11:17 AM    Hgb, External 10.6 2017    Hct, External 31.6 2017    Platelet cnt., External 270 2017    Antibody screen, External no antibodies detected 2017       Immunization History   Administered Date(s) Administered    Influenza Vaccine (Quad) 2015     Flu Shot given  Tdap 28-32 wks accepted and will give next visit    Assessment/Plan:    Lacey Olivares was seen today for routine prenatal visit. Diagnoses and all orders for this visit:    28 weeks gestation of pregnancy    Supervision of other high risk pregnancy, antepartum, third trimester  -     GLUCOSE, GESTATIONAL, 3 HR TOLERANCE    Abnormal laboratory test result  -     GLUCOSE, GESTATIONAL, 3 HR TOLERANCE    Hx of  section      Follow-up Disposition:  Return in about 2 weeks (around 2017) for HROB for, previous C/S.

## 2017-05-22 NOTE — PROGRESS NOTES
RETURN OB VISIT SUMMARY    Subjective:    28 y.o.    28w1d with has no unusual complaints, Denies LOF, dysuria, bleeding or cramping. Reports normal fetal movement. She is tolerating her diet and is taking PNV on a regular basis. She has not noted a change in fetal position. Objective:    Physical Exam:  Visit Vitals    /70    Pulse 88    Ht 5' 2\" (1.575 m)    Wt 190 lb (86.2 kg)    LMP 2016    BMI 34.75 kg/m2     Also, see prenatal vitals flowsheet. Patient without distress. Heart: Regular rate and rhythm, S1S2 present or without murmur or extra heart sounds  Lung: clear to auscultation throughout lung fields, no wheezes, no rales, no rhonchi and normal respiratory effort  Abdomen: soft, nontender, FHT 150s  Fundus: soft and non tender, 29cm  Ext: no calf tenderness or palpable cords    Lab Results   Component Value Date/Time    HGB 11.7 2017 11:17 AM    HCT 35.2 2017 11:17 AM    PLATELET 231  11:17 AM    ABO Group O 2017 11:17 AM    Rh (D) Positive 2017 11:17 AM    Antibody screen Negative 2017 11:17 AM    Chlamydia trachomatis, BRADLEY Negative 2017 11:53 AM    Neisseria gonorrhoeae, BRADLEY Negative 2017 11:53 AM    Hep B surface Ag screen Negative 2017 11:17 AM    HIV SCREEN 4TH GENERATION WRFX Non Reactive 2017 11:17 AM    Hgb, External 10.6 2017    Hct, External 31.6 2017    Platelet cnt., External 270 2017    Antibody screen, External no antibodies detected 2017       Immunization History   Administered Date(s) Administered    Influenza Vaccine (Quad) 2015     Flu Shot out of season  Tdap 28-32 wks discussed    Lab Results   Component Value Date/Time    HGB 11.7 2017 11:17 AM    Hgb, External 10.6 2017     1 hour glucose screen: 149    Assessment/Plan:      ICD-10-CM ICD-9-CM    1. 28 weeks gestation of pregnancy Z3A.28 V22.2    2.  Supervision of other high risk pregnancy, antepartum, third trimester O09.893 V23.89 GLUCOSE, GESTATIONAL, 3 HR TOLERANCE   3. Abnormal laboratory test result R89.9 796.4 GLUCOSE, GESTATIONAL, 3 HR TOLERANCE   4.  Hx of  section Z98.891 V45.89        Needs 3 hour GTT  Needs Tdap next visit  Will start iron supplementation   F/u in 2 weeks

## 2017-05-22 NOTE — MR AVS SNAPSHOT
Visit Information Date & Time Provider Department Dept. Phone Encounter #  
 5/22/2017 11:20 AM Britney Hopkins MD [facility] [phone] [id number] Upcoming Health Maintenance Date Due DTaP/Tdap/Td series (1 - Tdap) 12/23/2007 INFLUENZA AGE 9 TO ADULT 8/1/2017 PAP AKA CERVICAL CYTOLOGY 8/17/2018 Allergies as of 5/22/2017  Review Complete On: 5/22/2017 By: Sandy Gandhi RN No Known Allergies Current Immunizations  Never Reviewed Name Date Influenza Vaccine Aliciaprieto Blair) 12/28/2015 Not reviewed this visit You Were Diagnosed With   
  
 Codes Comments Abnormal laboratory test result    -  Primary ICD-10-CM: R89.9 ICD-9-CM: 796.4 Supervision of other high risk pregnancy, antepartum, third trimester     ICD-10-CM: O09.893 ICD-9-CM: V23.89 Vitals BP Pulse Height(growth percentile) Weight(growth percentile) LMP BMI  
 109/70 88 5' 2\" (1.575 m) 190 lb (86.2 kg) 11/06/2016 34.75 kg/m2 OB Status Smoking Status Having regular periods Never Smoker BMI and BSA Data Body Mass Index Body Surface Area 34.75 kg/m 2 1.94 m 2 Preferred Pharmacy Pharmacy Name Phone [facility] [address] [facility] [phone] Your Updated Medication List  
  
   
This list is accurate as of: 5/22/17 12:16 PM.  Always use your most recent med list.  
  
  
  
  
 prenatal vit-calcium-iron-fa 27 mg iron- 1 mg Tab Commonly known as:  PRENATAL PLUS with CALCIUM Take 1 Tab by mouth daily. VITAMIN D3 2,000 unit Tab Generic drug:  cholecalciferol (vitamin D3) Take  by mouth. We Performed the Following GLUCOSE, GESTATIONAL, 3 HR TOLERANCE [51855 CPT(R)] Patient Instructions For low hemoglobin: 
1) take 3 weeks of \"Slow Fe\" iron tablets with a Vitamin C pill. 2) then take 1 week of Vitamin C pill 3) repeat 3 weeks of iron and vitamin c and 1 week vitamin C for the rest of your pregnancy For abnormal sugar: 
1) get 3 hours glucose test.  Make sure you are fasting Tdap at next visit Introducing [facility]! Tonya Breen introduces [employer] patient portal. Now you can access parts of your medical record, email your doctor's office, and request medication refills online. 1. In your internet browser, go to [email or web address] 2. Click on the First Time User? Click Here link in the Sign In box. You will see the New Member Sign Up page. 3. Enter your [employer] Shield Access Code exactly as it appears below. You will not need to use this code after youve completed the sign-up process. If you do not sign up before the expiration date, you must request a new code. · [employer] Shield Access Code: [email or web address] Expires: 8/20/2017 12:16 PM 
 
4. Enter the last four digits of your Social Security Number (xxxx) and Date of Birth (mm/dd/yyyy) as indicated and click Submit. You will be taken to the next sign-up page. 5. Create a [employer] Shield ID. This will be your [employer] Shield login ID and cannot be changed, so think of one that is secure and easy to remember. 6. Create a [employer] Shield password. You can change your password at any time. 7. Enter your Password Reset Question and Answer. This can be used at a later time if you forget your password. 8. Enter your e-mail address. You will receive e-mail notification when new information is available in [address] 9. Click Sign Up. You can now view and download portions of your medical record. 10. Click the Download Summary menu link to download a portable copy of your medical information. If you have questions, please visit the Frequently Asked Questions section of the Bee Shield website. Remember, [employer] Shield is NOT to be used for urgent needs. For medical emergencies, dial 911. Now available from your iPhone and Android! Please provide this summary of care documentation to your next provider. Your primary care clinician is listed as Alban Toro. If you have any questions after today's visit, please call [phone].

## 2017-05-22 NOTE — PATIENT INSTRUCTIONS
For low hemoglobin:  1) take 3 weeks of \"Slow Fe\" iron tablets with a Vitamin C pill.   2) then take 1 week of Vitamin C pill  3) repeat 3 weeks of iron and vitamin c and 1 week vitamin C for the rest of your pregnancy    For abnormal sugar:  1) get 3 hours glucose test.  Make sure you are fasting    Tdap at next visit

## 2017-05-23 LAB
GTT 120 MIN, EXTERNAL: 98
GTT 180 MIN, EXTERNAL: 70
GTT 60 MIN, EXTERNAL: 141
GTT, FASTING, EXTERNAL: 76

## 2017-06-05 ENCOUNTER — ROUTINE PRENATAL (OUTPATIENT)
Dept: MIDWIFE SERVICES | Age: 32
End: 2017-06-05

## 2017-06-05 VITALS
TEMPERATURE: 98 F | HEART RATE: 91 BPM | SYSTOLIC BLOOD PRESSURE: 115 MMHG | BODY MASS INDEX: 34.66 KG/M2 | DIASTOLIC BLOOD PRESSURE: 71 MMHG | WEIGHT: 189.5 LBS

## 2017-06-05 DIAGNOSIS — O09.893 SUPERVISION OF OTHER HIGH RISK PREGNANCY, ANTEPARTUM, THIRD TRIMESTER: ICD-10-CM

## 2017-06-05 DIAGNOSIS — R73.09 ABNORMAL GTT (GLUCOSE TOLERANCE TEST): ICD-10-CM

## 2017-06-05 DIAGNOSIS — Z3A.30 30 WEEKS GESTATION OF PREGNANCY: Primary | ICD-10-CM

## 2017-06-05 DIAGNOSIS — O99.013 ANEMIA IN PREGNANCY, THIRD TRIMESTER: ICD-10-CM

## 2017-06-05 DIAGNOSIS — Z98.891 HX OF CESAREAN SECTION: ICD-10-CM

## 2017-06-05 NOTE — PROGRESS NOTES
Chief Complaint   Patient presents with    Routine Prenatal Visit     30w1d     Visit Vitals    /71    Pulse 91    Temp 98 °F (36.7 °C) (Oral)    Wt 189 lb 8 oz (86 kg)    BMI 34.66 kg/m2     1. Have you been to the ER, urgent care clinic since your last visit? Hospitalized since your last visit? No    2. Have you seen or consulted any other health care providers outside of the [address] [place] [address] since your last visit? Include any pap smears or colon screening. No    After obtaining consent, and per orders of Dr Maryanna Lanes injection of TDAP given in RIGHT ARM by Burke Naylor RN. Patient instructed to remain in clini for 20 minutes afterwards and to report any adverse reaction to me immediately.  Lot: [id number] Exp: 07/08/19 [id number]

## 2017-06-05 NOTE — PATIENT INSTRUCTIONS
Weeks 30 to 32 of Your Pregnancy: Care Instructions  Your Care Instructions    You have made it to the final months of your pregnancy. By now, your baby is really starting to look like a baby, with hair and plump skin. As you enter the final weeks of pregnancy, the reality of having a baby may start to set in. This is the time to settle on a name, get your household in order, set up a safe nursery, and find quality  if needed. Doing these things in advance will allow you to focus on caring for and enjoying your new baby. You may also want to have a tour of your hospital's labor and delivery unit to get a better idea of what to expect while you are in the hospital.  During these last months, it is very important to take good care of yourself and pay attention to what your body needs. If your doctor says it is okay for you to work, don't push yourself too hard. Use the tips provided in this care sheet to ease heartburn and care for varicose veins. If you haven't already had the Tdap shot during this pregnancy, talk to your doctor about getting it. It will help protect your  against pertussis infection. Follow-up care is a key part of your treatment and safety. Be sure to make and go to all appointments, and call your doctor if you are having problems. It's also a good idea to know your test results and keep a list of the medicines you take. How can you care for yourself at home? Pay attention to your baby's movements  · You should feel your baby move several times every day. · Your baby now turns less, and kicks and jabs more. · Your baby sleeps 20 to 45 minutes at a time and is more active at certain times of day. · If your doctor wants you to count your baby's kicks:  ¨ Empty your bladder, and lie on your side or relax in a comfortable chair. ¨ Write down your start time. ¨ Pay attention only to your baby's movements. Count any movement except hiccups.   ¨ After you have counted 10 movements, write down your stop time. ¨ Write down how many minutes it took for your baby to move 10 times. ¨ If an hour goes by and you have not recorded 10 movements, have something to eat or drink and then count for another hour. If you do not record 10 movements in either hour, call your doctor. Ease heartburn  · Eat small, frequent meals. · Do not eat chocolate, peppermint, or very spicy foods. Avoid drinks with caffeine, such as coffee, tea, and sodas. · Avoid bending over or lying down after meals. · Talk a short walk after you eat. · If heartburn is a problem at night, do not eat for 2 hours before bedtime. · Take antacids like Mylanta, Maalox, Rolaids, or Tums. Do not take antacids that have sodium bicarbonate. Care for varicose veins  · Varicose veins are blood vessels that stretch out with the extra blood during pregnancy. Your legs may ache or throb. Most varicose veins will go away after the birth. · Avoid standing for long periods of time. Sit with your legs crossed at the ankles, not the knees. · Sit with your feet propped up. · Avoid tight clothing or stockings. Wear support hose. · Exercise regularly. Try walking for at least 30 minutes a day. Where can you learn more? Go to http://addison-cosme.info/. Enter I716 in the search box to learn more about \"Weeks 30 to 32 of Your Pregnancy: Care Instructions. \"  Current as of: May 30, 2016  Content Version: 11.2  © 0219-0537 [employer]. Care instructions adapted under license by [employer] (which disclaims liability or warranty for this information). If you have questions about a medical condition or this instruction, always ask your healthcare professional. [place] [address] any warranty or liability for your use of this information.          Learning About When to Call Your Doctor During Pregnancy (After 20 Weeks)  Your Care Instructions  It's common to have concerns about what might be a problem during pregnancy. Although most pregnant women don't have any serious problems, it's important to know when to call your doctor if you have certain symptoms or signs of labor. These are general suggestions. Your doctor may give you some more information about when to call. When to call your doctor (after 20 weeks)  Call 911 anytime you think you may need emergency care. For example, call if:  · You have severe vaginal bleeding. · You have sudden, severe pain in your belly. · You passed out (lost consciousness). · You have a seizure. · You see or feel the umbilical cord. · You think you are about to deliver your baby and can't make it safely to the hospital.  Call your doctor now or seek immediate medical care if:  · You have vaginal bleeding. · You have belly pain. · You have a fever. · You have symptoms of preeclampsia, such as:  ¨ Sudden swelling of your face, hands, or feet. ¨ New vision problems (such as dimness or blurring). ¨ A severe headache. · You have a sudden release of fluid from your vagina. (You think your water broke.)  · You think that you may be in labor. This means that you've had at least 4 contractions within 20 minutes or at least 8 contractions in an hour. · You notice that your baby has stopped moving or is moving much less than normal.  · You have symptoms of a urinary tract infection. These may include:  ¨ Pain or burning when you urinate. ¨ A frequent need to urinate without being able to pass much urine. ¨ Pain in the flank, which is just below the rib cage and above the waist on either side of the back. ¨ Blood in your urine. Watch closely for changes in your health, and be sure to contact your doctor if:  · You have vaginal discharge that smells bad. · You have skin changes, such as:  ¨ A rash. ¨ Itching. ¨ Yellow color to your skin. · You have other concerns about your pregnancy.   If you have labor signs at 37 weeks or more  If you have signs of labor at 37 weeks or more, your doctor may tell you to call when your labor becomes more active. Symptoms of active labor include:  · Contractions that are regular. · Contractions that are less than 5 minutes apart. · Contractions that are hard to talk through. Follow-up care is a key part of your treatment and safety. Be sure to make and go to all appointments, and call your doctor if you are having problems. It's also a good idea to know your test results and keep a list of the medicines you take. Where can you learn more? Go to http://addison-cosme.info/. Enter  in the search box to learn more about \"Learning About When to Call Your Doctor During Pregnancy (After 20 Weeks). \"  Current as of: May 30, 2016  Content Version: 11.2  © 3092-0827 [employer]. Care instructions adapted under license by [employer] (which disclaims liability or warranty for this information). If you have questions about a medical condition or this instruction, always ask your healthcare professional. [address] any warranty or liability for your use of this information.

## 2017-06-05 NOTE — PROGRESS NOTES
RETURN OB VISIT SUMMARY    Subjective:    28 y.o.    30w1d has no unusual complaints, Denies LOF, dysuria, bleeding or cramping. Reports normal fetal movement. She is tolerating her diet and is taking PNV on a regular basis. Objective:    Physical Exam:  Visit Vitals    /71    Pulse 91    Temp 98 °F (36.7 °C) (Oral)    Wt 189 lb 8 oz (86 kg)    LMP 2016    BMI 34.66 kg/m2     Also, see prenatal vitals flowsheet. Patient without distress. Heart: Regular rate and rhythm, S1S2 present or without murmur or extra heart sounds  Lung: clear to auscultation throughout lung fields, no wheezes, no rales, no rhonchi and normal respiratory effort  Abdomen: soft, nontender, FHT 32  Fundus: soft and non tender, 120-130s cm  Ext: no calf tenderness or palpable cords    Lab Results   Component Value Date/Time    HGB 11.7 2017 11:17 AM    HCT 35.2 2017 11:17 AM    PLATELET 754 2075 11:17 AM    ABO Group O 2017 11:17 AM    Rh (D) Positive 2017 11:17 AM    Antibody screen Negative 2017 11:17 AM    Chlamydia trachomatis, BRADLEY Negative 2017 11:53 AM    Neisseria gonorrhoeae, BRADLEY Negative 2017 11:53 AM    Hep B surface Ag screen Negative 2017 11:17 AM    HIV SCREEN 4TH GENERATION WRFX Non Reactive 2017 11:17 AM    Hgb, External 10.6 2017    Hct, External 31.6 2017    Platelet cnt., External 270 2017    Antibody screen, External no antibodies detected 2017       Immunization History   Administered Date(s) Administered    Influenza Vaccine (Quad) 2015     Flu Shot out of season  Tdap 28-32 wks given    Assessment/Plan:    ICD-10-CM ICD-9-CM    1. 30 weeks gestation of pregnancy Z3A.30 V22.2    2. Anemia in pregnancy, third trimester O99.013 648.23    3. Abnormal GTT (glucose tolerance test) R73.02 790.22    4. Supervision of other high risk pregnancy, antepartum, third trimester O09.893 V23.89    5.  Hx of  section Z98.891 V45.89      -Continue PNV and routine prenatal care  -Continue iron for anemia in pregnancy  -Had her 3hr GTT at [facility]. Need to f/u results, which pt asked to have faxed over. Nursing will call this afternoon to check.  -Tdap today  -First pregnancy C/S for breech, subsequent second pregnancy was successful .  Pt would like to try for  again.  -Rtc in 2 weeks for HROB, previous C/S

## 2017-06-07 LAB
GLUCOSE - FASTING: 141
GLUCOSE P FAST SERPL-MCNC: 76 MG/DL
GLUCOSE, 3 HR,GTT3: 70
Lab: 98

## 2017-06-20 ENCOUNTER — ROUTINE PRENATAL (OUTPATIENT)
Dept: MIDWIFE SERVICES | Age: 32
End: 2017-06-20

## 2017-06-20 VITALS
HEART RATE: 91 BPM | WEIGHT: 192 LBS | DIASTOLIC BLOOD PRESSURE: 69 MMHG | HEIGHT: 62 IN | BODY MASS INDEX: 35.33 KG/M2 | SYSTOLIC BLOOD PRESSURE: 115 MMHG

## 2017-06-20 DIAGNOSIS — Z3A.32 32 WEEKS GESTATION OF PREGNANCY: ICD-10-CM

## 2017-06-20 DIAGNOSIS — Z98.891 HX OF CESAREAN SECTION: ICD-10-CM

## 2017-06-20 DIAGNOSIS — O09.893 SUPERVISION OF OTHER HIGH RISK PREGNANCY, ANTEPARTUM, THIRD TRIMESTER: Primary | ICD-10-CM

## 2017-06-20 NOTE — PROGRESS NOTES
Chief Complaint   Patient presents with    Routine Prenatal Visit     32w2d     Visit Vitals    /69    Pulse 91    Ht 5' 2\" (1.575 m)    Wt 192 lb (87.1 kg)    BMI 35.12 kg/m2     1. Have you been to the ER, urgent care clinic since your last visit? Hospitalized since your last visit? No    2. Have you seen or consulted any other health care providers outside of the [address] [place] [address] since your last visit? Include any pap smears or colon screening. No     Here today for a routine prenatal visit and she has no complaints or concerns.

## 2017-06-20 NOTE — PROGRESS NOTES
RETURN OB VISIT SUMMARY    Subjective:    28 y.o.    32w2d with has no unusual complaints, Denies LOF, dysuria, bleeding or cramping. Reports normal fetal movement. She istolerating her diet and is taking PNV on a regular basis. She has not noted a change in fetal position. She has MFM appointment in 2-3 weeks to follow up on fetal kidneys. She failed the screening diabetes lab but passed the 3 hour with only one abnormal value. Objective:    Physical Exam:  Visit Vitals    /69    Pulse 91    Ht 5' 2\" (1.575 m)    Wt 192 lb (87.1 kg)    LMP 2016    BMI 35.12 kg/m2     Also, see prenatal vitals flowsheet. Patient without distress. Fundus: soft and non tender    Lab Results   Component Value Date/Time    HGB 11.7 2017 11:17 AM    HCT 35.2 2017 11:17 AM    PLATELET 525  11:17 AM    ABO Group O 2017 11:17 AM    Rh (D) Positive 2017 11:17 AM    Antibody screen Negative 2017 11:17 AM    Glucose - 2 hour 98 2017    Chlamydia trachomatis, BRADLEY Negative 2017 11:53 AM    Neisseria gonorrhoeae, BRADLEY Negative 2017 11:53 AM    Hep B surface Ag screen Negative 2017 11:17 AM    HIV SCREEN 4TH GENERATION WRFX Non Reactive 2017 11:17 AM    Hgb, External 10.6 2017    Hct, External 31.6 2017    Platelet cnt., External 270 2017    Antibody screen, External no antibodies detected 2017       Immunization History   Administered Date(s) Administered    Influenza Vaccine (Quad) 2015    Tdap 2017     Assessment/Plan:    Sarah Monsalve was seen today for routine prenatal visit. Diagnoses and all orders for this visit:    Supervision of other high risk pregnancy, antepartum, third trimester    Hx of  section    32 weeks gestation of pregnancy      Follow-up Disposition:  Return in about 2 weeks (around 2017) for HROB for, previous C/S.

## 2017-06-20 NOTE — PATIENT INSTRUCTIONS

## 2017-06-20 NOTE — PROGRESS NOTES
Attending note:      I was present with the resident during the interview & examination of the patient. I personally interviewed the patient and repeated the critical or key portions of the exam.  I confirmed/amended the history, exam, assessment and plan as noted. Immunization History   Administered Date(s) Administered    Influenza Vaccine (Quad) 2015    Tdap 2017       Assessment/Plan:  Alberto Dave was seen today for routine prenatal visit. Diagnoses and all orders for this visit:    30 weeks gestation of pregnancy  -     TETANUS, DIPHTHERIA TOXOIDS AND ACELLULAR PERTUSSIS VACCINE (TDAP), IN INDIVIDS. >=7, IM  -     [place] IMMUNIZ ADMIN,1 SINGLE/COMB VAC/TOXOID    Anemia in pregnancy, third trimester    Abnormal GTT (glucose tolerance test)    Supervision of other high risk pregnancy, antepartum, third trimester    Hx of  section      Follow-up Disposition:  Return in about 2 weeks (around 2017) for HROB for, previous C/S. Isaak Salazar MD, [address] [place] [address], Retired    Radha Black, [address]

## 2017-07-03 ENCOUNTER — ROUTINE PRENATAL (OUTPATIENT)
Dept: MIDWIFE SERVICES | Age: 32
End: 2017-07-03

## 2017-07-03 VITALS
HEIGHT: 62 IN | SYSTOLIC BLOOD PRESSURE: 111 MMHG | DIASTOLIC BLOOD PRESSURE: 71 MMHG | BODY MASS INDEX: 35.33 KG/M2 | HEART RATE: 101 BPM | WEIGHT: 192 LBS

## 2017-07-03 DIAGNOSIS — Q61.3 KIDNEY POLYCYSTIC DISEASE: ICD-10-CM

## 2017-07-03 DIAGNOSIS — Z3A.34 34 WEEKS GESTATION OF PREGNANCY: ICD-10-CM

## 2017-07-03 DIAGNOSIS — Z98.891 HX OF CESAREAN SECTION: ICD-10-CM

## 2017-07-03 DIAGNOSIS — O09.893 SUPERVISION OF OTHER HIGH RISK PREGNANCY, ANTEPARTUM, THIRD TRIMESTER: Primary | ICD-10-CM

## 2017-07-03 RX ORDER — ASCORBIC ACID 500 MG
TABLET ORAL
COMMUNITY

## 2017-07-03 RX ORDER — LANOLIN ALCOHOL/MO/W.PET/CERES
CREAM (GRAM) TOPICAL
COMMUNITY

## 2017-07-03 NOTE — MR AVS SNAPSHOT
Visit Information Date & Time Provider Department Dept. Phone Encounter #  
 7/3/2017  1:00 PM Adriel Lawrence MD [facility] [phone] [id number] Follow-up Instructions Return in about 2 weeks (around 2017) for HROB for, previous C/S. Your Appointments 2017  1:00 PM  
OB VISIT with Adriel Lawrence MD  
[address] [facility] Appt Note: ret ob  
 [address] [facility]. [place] [address] [place] [address]  
[phone]  
  
   
 [address] [facility] [address] [facility] Upcoming Health Maintenance Date Due INFLUENZA AGE 9 TO ADULT 2017 PAP AKA CERVICAL CYTOLOGY 2018 Allergies as of 7/3/2017  Review Complete On: 7/3/2017 By: Adriel Lawrence MD  
 No Known Allergies Current Immunizations  Never Reviewed Name Date Influenza Vaccine Chinyere Kendall) 2015 Tdap 2017 Not reviewed this visit You Were Diagnosed With   
  
 Codes Comments Supervision of other high risk pregnancy, antepartum, third trimester    -  Primary ICD-10-CM: W57.068 ICD-9-CM: V23.89 Hx of  section     ICD-10-CM: T57.300 ICD-9-CM: V45.89 Kidney polycystic disease     ICD-10-CM: Q61.3 ICD-9-CM: 753.12   
 34 weeks gestation of pregnancy     ICD-10-CM: Z3A.34 
ICD-9-CM: V22.2 Vitals BP Pulse Height(growth percentile) Weight(growth percentile) LMP BMI  
 111/71 (!) 101 5' 2\" (1.575 m) 192 lb (87.1 kg) 2016 35.12 kg/m2 OB Status Smoking Status Having regular periods Never Smoker BMI and BSA Data Body Mass Index Body Surface Area  
 35.12 kg/m 2 1.95 m 2 Preferred Pharmacy Pharmacy Name Phone [facility] [address] [facility] [phone] Your Updated Medication List  
  
   
This list is accurate as of: 7/3/17  1:19 PM.  Always use your most recent med list.  
  
  
  
  
 Iron 325 mg (65 mg iron) tablet Generic drug:  ferrous sulfate Take  by mouth Daily (before breakfast). prenatal vit-calcium-iron-fa 27 mg iron- 1 mg Tab Commonly known as:  PRENATAL PLUS with CALCIUM Take 1 Tab by mouth daily. VITAMIN C 500 mg tablet Generic drug:  ascorbic acid (vitamin C) Take  by mouth. VITAMIN D3 2,000 unit Tab Generic drug:  cholecalciferol (vitamin D3) Take  by mouth. Follow-up Instructions Return in about 2 weeks (around 7/17/2017) for HROB for, previous C/S. To-Do List   
 07/10/2017 1:30 PM  
  Appointment with ULTRASOUND 1 SFM at [facility] ([phone]) Patient Instructions Thank you for choosing [address] [facility]. We know you have many options when it comes to your healthcare; we appreciate that you picked us. Our goal is to provide exceptional service and world class care for every patient. You may receive a survey in the mail or by email asking for your feedback. Please take a few minutes to share your thoughts about your recent visit. Your comments helps us understand what we do well and what we can do better. To ensure confidentiality, this survey is administered by an independent third-party, [address] [place] [address] participation will help us to continue and improve the quality of care that we provide to you, your family, friends, and neighbors. Thank you! Weeks 34 to 36 of Your Pregnancy: Care Instructions Your Care Instructions By now, your baby and your belly have grown quite large. It is almost time to give birth. A full-term pregnancy can deliver between 37 and 42 weeks. Your baby's lungs are almost ready to breathe air. The bones in your baby's head are now firm enough to protect it, but soft enough to move down through the birth canal. 
You may feel excited, happy, anxious, or scared.  You may wonder how you will know if you are in labor or what to expect during labor. Try to be flexible in your expectations of the birth. Because each birth is different, there is no way to know exactly what childbirth will be like for you. This care sheet will help you know what to expect and how to prepare. This may make your childbirth easier. If you haven't already had the Tdap shot during this pregnancy, talk to your doctor about getting it. It will help protect your  against pertussis infection. In the 36th week, most women have a test for group B streptococcus (GBS). GBS is a common bacteria that can live in the vagina and rectum. It can make your baby sick after birth. If you test positive, you will get antibiotics during labor. The medicine will keep your baby from getting the bacteria. Follow-up care is a key part of your treatment and safety. Be sure to make and go to all appointments, and call your doctor if you are having problems. It's also a good idea to know your test results and keep a list of the medicines you take. How can you care for yourself at home? Learn about pain relief choices · Pain is different for every woman. Talk with your doctor about your feelings about pain. · You can choose from several types of pain relief. These include medicine or breathing techniques, as well as comfort measures. You can use more than one option. · If you choose to have pain medicine during labor, talk to your doctor about your options. Some medicines lower anxiety and help with some of the pain. Others make your lower body numb so that you won't feel pain. · Be sure to tell your doctor about your pain medicine choice before you start labor or very early in your labor. You may be able to change your mind as labor progresses. · Rarely, a woman is put to sleep by medicine given through a mask or an IV. Labor and delivery · The first stage of labor has three parts: early, active, and transition. ¨ Most women have early labor at home. You can stay busy or rest, eat light snacks, drink clear fluids, and start counting contractions. ¨ When talking during a contraction gets hard, you may be moving to active labor. During active labor, you should head for the hospital if you are not there already. ¨ You are in active labor when contractions come every 3 to 4 minutes and last about 60 seconds. Your cervix is opening more rapidly. ¨ If your water breaks, contractions will come faster and stronger. ¨ During transition, your cervix is stretching, and contractions are coming more rapidly. ¨ You may want to push, but your cervix might not be ready. Your doctor will tell you when to push. · The second stage starts when your cervix is completely opened and you are ready to push. ¨ Contractions are very strong to push the baby down the birth canal. 
¨ You will feel the urge to push. You may feel like you need to have a bowel movement. ¨ You may be coached to push with contractions. These contractions will be very strong, but you will not have them as often. You can get a little rest between contractions. ¨ You may be emotional and irritable. You may not be aware of what is going on around you. ¨ One last push, and your baby is born. · The third stage is when a few more contractions push out the placenta. This may take 30 minutes or less. · The fourth stage is the welcome recovery. You may feel overwhelmed with emotions and exhausted but alert. This is a good time to start breastfeeding. Where can you learn more? Go to http://addison-cosme.info/. Enter U671 in the search box to learn more about \"Weeks 34 to 36 of Your Pregnancy: Care Instructions. \" Current as of: March 16, 2017 Content Version: 11.3 © 2573-0993 FileString.  Care instructions adapted under license by rubberit (which disclaims liability or warranty for this information). If you have questions about a medical condition or this instruction, always ask your healthcare professional. [address] any warranty or liability for your use of this information. Introducing [facility] HEALTH SERVICES! [facility] introduces Valeritas patient portal. Now you can access parts of your medical record, email your doctor's office, and request medication refills online. 1. In your internet browser, go to [email or web address] 2. Click on the First Time User? Click Here link in the Sign In box. You will see the New Member Sign Up page. 3. Enter your Valeritas Access Code exactly as it appears below. You will not need to use this code after youve completed the sign-up process. If you do not sign up before the expiration date, you must request a new code. · Valeritas Access Code: [email or web address] Expires: 8/20/2017 12:16 PM 
 
4. Enter the last four digits of your Social Security Number (xxxx) and Date of Birth (mm/dd/yyyy) as indicated and click Submit. You will be taken to the next sign-up page. 5. Create a Valeritas ID. This will be your Valeritas login ID and cannot be changed, so think of one that is secure and easy to remember. 6. Create a Valeritas password. You can change your password at any time. 7. Enter your Password Reset Question and Answer. This can be used at a later time if you forget your password. 8. Enter your e-mail address. You will receive e-mail notification when new information is available in [address] 9. Click Sign Up. You can now view and download portions of your medical record. 10. Click the Download Summary menu link to download a portable copy of your medical information. If you have questions, please visit the Frequently Asked Questions section of the Valeritas website. Remember, Valeritas is NOT to be used for urgent needs. For medical emergencies, dial 911. Now available from your iPhone and Android! Please provide this summary of care documentation to your next provider. Your primary care clinician is listed as Epimenio Slot. If you have any questions after today's visit, please call [phone].

## 2017-07-03 NOTE — PROGRESS NOTES
RETURN OB VISIT SUMMARY    Subjective:    28 y.o.    34w1d with , Denies LOF, dysuria, bleeding or cramping. Reports normal fetal movement. She istolerating her diet and is taking PNV on a regular basis. She has not noted a change in fetal position. Objective:    Physical Exam:  Visit Vitals    /71    Pulse (!) 101    Ht 5' 2\" (1.575 m)    Wt 192 lb (87.1 kg)    LMP 2016    BMI 35.12 kg/m2     Also, see prenatal vitals flowsheet. Patient without distress. Fundus: soft and non tender    Lab Results   Component Value Date/Time    HGB 11.7 2017 11:17 AM    HCT 35.2 2017 11:17 AM    PLATELET 916  11:17 AM    ABO Group O 2017 11:17 AM    Rh (D) Positive 2017 11:17 AM    Antibody screen Negative 2017 11:17 AM    Glucose - 2 hour 98 2017    Chlamydia trachomatis, BRADLEY Negative 2017 11:53 AM    Neisseria gonorrhoeae, BRADLEY Negative 2017 11:53 AM    Hep B surface Ag screen Negative 2017 11:17 AM    HIV SCREEN 4TH GENERATION WRFX Non Reactive 2017 11:17 AM    Hgb, External 10.6 2017    Hct, External 31.6 2017    Platelet cnt., External 270 2017    Antibody screen, External no antibodies detected 2017       Immunization History   Administered Date(s) Administered    Influenza Vaccine (Quad) 2015    Tdap 2017       Assessment/Plan:    Nuvia Rico was seen today for routine prenatal visit. Diagnoses and all orders for this visit:    Supervision of other high risk pregnancy, antepartum, third trimester    Hx of  section    Kidney polycystic disease affecting infant in prior preg    34 weeks gestation of pregnancy      Follow-up Disposition:  Return in about 2 weeks (around 2017) for HROB for, previous C/S.

## 2017-07-03 NOTE — PROGRESS NOTES
Chief Complaint   Patient presents with    Routine Prenatal Visit     34w1d     Visit Vitals    /71    Pulse (!) 101    Ht 5' 2\" (1.575 m)    Wt 192 lb (87.1 kg)    BMI 35.12 kg/m2     1. Have you been to the ER, urgent care clinic since your last visit? Hospitalized since your last visit? No    2. Have you seen or consulted any other health care providers outside of the [address] [place] [address] since your last visit? Include any pap smears or colon screening. No     Here today for a routine prenatal visit and she has no complaints or concerns.

## 2017-07-10 ENCOUNTER — HOSPITAL ENCOUNTER (OUTPATIENT)
Dept: PERINATAL CARE | Age: 32
Discharge: HOME | End: 2017-07-10
Attending: OBSTETRICS & GYNECOLOGY
Payer: COMMERCIAL

## 2017-07-10 PROCEDURE — 76816 OB US FOLLOW-UP PER FETUS: CPT | Performed by: OBSTETRICS & GYNECOLOGY

## 2017-07-25 ENCOUNTER — ROUTINE PRENATAL (OUTPATIENT)
Dept: MIDWIFE SERVICES | Age: 32
End: 2017-07-25

## 2017-07-25 VITALS
HEART RATE: 105 BPM | SYSTOLIC BLOOD PRESSURE: 105 MMHG | WEIGHT: 196 LBS | HEIGHT: 62 IN | DIASTOLIC BLOOD PRESSURE: 72 MMHG | BODY MASS INDEX: 36.07 KG/M2

## 2017-07-25 DIAGNOSIS — O09.93 ENCOUNTER FOR SUPERVISION OF HIGH RISK PREGNANCY IN THIRD TRIMESTER, ANTEPARTUM: Primary | ICD-10-CM

## 2017-07-25 DIAGNOSIS — Z3A.37 37 WEEKS GESTATION OF PREGNANCY: ICD-10-CM

## 2017-07-25 DIAGNOSIS — Z98.891 HX OF CESAREAN SECTION: ICD-10-CM

## 2017-07-25 NOTE — PROGRESS NOTES
RETURN OB VISIT SUMMARY    Subjective:    28 y.o.   at 37w2d. Previous pregnancy complicated by infant with polycystic kidney disease, who did not survive. States she is doing well without complaints or concerns. + fetal movement; no fluid leakage or vaginal bleeding. She is taking PNV, Fe + VitC, VitD. She is tolerating diet and is hydrating well. Endorsed occasional white discharge, but no vaginal pruritus or pain. Endorsed vaginal pressure with prolonged standing. Objective:    Physical Exam:  Visit Vitals    /72    Pulse (!) 105    Ht 5' 2\" (1.575 m)    Wt 196 lb (88.9 kg)    LMP 2016    BMI 35.85 kg/m2       Patient with distress. Heart: Regular rate and rhythm or without murmur or extra heart sounds  Lung: clear to auscultation throughout lung fields, no wheezes, no rales, no rhonchi and normal respiratory effort  Abdomen: gravid, nontender. FH 37 cm; FHT 140s  Lower Extremities: no LE edema     [facility] Ultrasound    - cephalic presentation. Normal JUJU 20.9cm. 3v cord. Anterior placenta. Normal fetal anatomy. Both fetal kidneys appear normal. Fetal growth appropriate for gestational age. Lab Results   Component Value Date/Time    HGB 11.7 2017 11:17 AM    HCT 35.2 2017 11:17 AM    PLATELET 664  11:17 AM    ABO Group O 2017 11:17 AM    Antibody screen Negative 2017 11:17 AM    Glucose - 2 hour 98 2017    Rubella Ab, IgG 2.38 2017 11:17 AM    Hep B surface Ag screen Negative 2017 11:17 AM    Hgb, External 10.6 2017    Hct, External 31.6 2017    Platelet cnt., External 270 2017    Antibody screen, External no antibodies detected 2017     Immunization History   Administered Date(s) Administered    Influenza Vaccine (Quad) 2015    Tdap 2017         Assessment/Plan:    Pt was seen today for routine prenatal visit at [address] [place] [address] [address] 2 d       ICD-10-CM ICD-9-CM    1.  Encounter for supervision of high risk pregnancy in third trimester, antepartum O09.93 V23.9 GROUP B STREP BY BRADLEY   2. Hx of  section Z98.891 V45.89    3. 37 weeks gestation of pregnancy Z3A.37 V22.2    4.  Kidney polycystic disease affecting infant in prior pregnancy     Patient seen and discussed with Dr. Albaro Vick, Attending Physician     Keyla Orozco MD   Family Medicine Resident, PGY2

## 2017-07-25 NOTE — PROGRESS NOTES
Chief Complaint   Patient presents with    Routine Prenatal Visit     37w2d     Visit Vitals    /72    Pulse (!) 105    Ht 5' 2\" (1.575 m)    Wt 196 lb (88.9 kg)    BMI 35.85 kg/m2       1. Have you been to the ER, urgent care clinic since your last visit? Hospitalized since your last visit? No    2. Have you seen or consulted any other health care providers outside of the [address] [place] [address] since your last visit? Include any pap smears or colon screening. No     Here today for a routine prenatal visit and she has no complaints or concerns. Verbal order for gbs. Sent to [facility] for insurance purposes per pt.

## 2017-07-25 NOTE — MR AVS SNAPSHOT
Visit Information Date & Time Provider Department Dept. Phone Encounter #  
 2017  1:00 PM Rikki Gasca MD [address] [facility] [phone] [id number] Your Appointments 2017  3:00 PM  
OB VISIT with Rikki Gasca MD  
[address]) Appt Note: ret ob  
 [address] [place] [address] [place] [address]  
[phone]  
  
   
 [address] [facility] Upcoming Health Maintenance Date Due INFLUENZA AGE 9 TO ADULT 2017 PAP AKA CERVICAL CYTOLOGY 2018 Allergies as of 2017  Review Complete On: 2017 By: Aisha Holliday RN No Known Allergies Current Immunizations  Never Reviewed Name Date Influenza Vaccine Aleah Demario) 2015 Tdap 2017 Not reviewed this visit You Were Diagnosed With   
  
 Codes Comments Encounter for supervision of other normal pregnancy in third trimester    -  Primary ICD-10-CM: Z34.83 ICD-9-CM: V22.1 Hx of  section     ICD-10-CM: U96.750 ICD-9-CM: V45.89   
 37 weeks gestation of pregnancy     ICD-10-CM: Z3A.37 
ICD-9-CM: V22.2 Vitals BP Pulse Height(growth percentile) Weight(growth percentile) LMP BMI  
 105/72 (!) 105 5' 2\" (1.575 m) 196 lb (88.9 kg) 2016 35.85 kg/m2 OB Status Smoking Status Having regular periods Never Smoker BMI and BSA Data Body Mass Index Body Surface Area  
 35.85 kg/m 2 1.97 m 2 Preferred Pharmacy Pharmacy Name Phone [facility] [address] [facility] [phone] Your Updated Medication List  
  
   
This list is accurate as of: 17  1:37 PM.  Always use your most recent med list.  
  
  
  
  
 Iron 325 mg (65 mg iron) tablet Generic drug:  ferrous sulfate Take  by mouth Daily (before breakfast). prenatal vit-calcium-iron-fa 27 mg iron- 1 mg Tab Commonly known as:  PRENATAL PLUS with CALCIUM Take 1 Tab by mouth daily. VITAMIN C 500 mg tablet Generic drug:  ascorbic acid (vitamin C) Take  by mouth. VITAMIN D3 2,000 unit Tab Generic drug:  cholecalciferol (vitamin D3) Take  by mouth. We Performed the Following GROUP B STREP BY BRADLEY [[id number] Custom] Introducing [facility] SERVICES! [facility] introduces aiHit patient portal. Now you can access parts of your medical record, email your doctor's office, and request medication refills online. 1. In your internet browser, go to [email or web address] 2. Click on the First Time User? Click Here link in the Sign In box. You will see the New Member Sign Up page. 3. Enter your aiHit Access Code exactly as it appears below. You will not need to use this code after youve completed the sign-up process. If you do not sign up before the expiration date, you must request a new code. · aiHit Access Code: [email or web address] Expires: 8/20/2017 12:16 PM 
 
4. Enter the last four digits of your Social Security Number (xxxx) and Date of Birth (mm/dd/yyyy) as indicated and click Submit. You will be taken to the next sign-up page. 5. Create a aiHit ID. This will be your aiHit login ID and cannot be changed, so think of one that is secure and easy to remember. 6. Create a aiHit password. You can change your password at any time. 7. Enter your Password Reset Question and Answer. This can be used at a later time if you forget your password. 8. Enter your e-mail address. You will receive e-mail notification when new information is available in [address] 9. Click Sign Up. You can now view and download portions of your medical record. 10. Click the Download Summary menu link to download a portable copy of your medical information.  
 
If you have questions, please visit the Frequently Asked Questions section of the [employer]. Remember, Cyber Giftshart is NOT to be used for urgent needs. For medical emergencies, dial 911. Now available from your iPhone and Android! Please provide this summary of care documentation to your next provider. Your primary care clinician is listed as Christian Mason. If you have any questions after today's visit, please call [phone].

## 2017-07-27 LAB
GP B STREP DNA SPEC QL NAA+PROBE: NEGATIVE
GRBS, EXTERNAL: NEGATIVE

## 2017-08-01 ENCOUNTER — ROUTINE PRENATAL (OUTPATIENT)
Dept: MIDWIFE SERVICES | Age: 32
End: 2017-08-01

## 2017-08-01 VITALS
HEIGHT: 62 IN | HEART RATE: 99 BPM | BODY MASS INDEX: 36.33 KG/M2 | DIASTOLIC BLOOD PRESSURE: 72 MMHG | WEIGHT: 197.4 LBS | SYSTOLIC BLOOD PRESSURE: 116 MMHG

## 2017-08-01 DIAGNOSIS — Z3A.38 38 WEEKS GESTATION OF PREGNANCY: ICD-10-CM

## 2017-08-01 DIAGNOSIS — Z98.891 HX OF CESAREAN SECTION: ICD-10-CM

## 2017-08-01 DIAGNOSIS — O09.893 SUPERVISION OF OTHER HIGH RISK PREGNANCY, ANTEPARTUM, THIRD TRIMESTER: Primary | ICD-10-CM

## 2017-08-01 NOTE — PROGRESS NOTES
Chief Complaint   Patient presents with    Routine Prenatal Visit     38w2d     Visit Vitals    /72    Pulse 99    Ht 5' 2\" (1.575 m)    Wt 197 lb 6.4 oz (89.5 kg)    BMI 36.1 kg/m2       1. Have you been to the ER, urgent care clinic since your last visit? Hospitalized since your last visit? No    2. Have you seen or consulted any other health care providers outside of the [address] [place] [address] since your last visit? Include any pap smears or colon screening. No    Here today for a routine prenatal visit and she has no complaints or concerns.

## 2017-08-01 NOTE — PROGRESS NOTES
Return OB Visit       Subjective:   Tami Corrales 28 y.o. [place]   MIQUEL: 8/13/2017, by Last Menstrual Period  GA:  38w2d. States she does not have abdominal pain  , chest pain, contractions, fever, headache , nausea and vomiting, shortness of breath, vaginal bleeding , vaginal leaking of fluid  and visual disturbances. She is not doing kick counts, she is taking PNV and she is eating healthy. Objective:   Physical Exam:  Blood pressure 116/72, pulse 99, height 5' 2\" (1.575 m), weight 197 lb 6.4 oz (89.5 kg), last menstrual period 11/06/2016. GENERAL APPEARANCE: alert, well appearing, in no apparent distress  LUNGS: clear to auscultation, no wheezes, rales or rhonchi, symmetric air entry  HEART: regular rate and rhythm, no murmurs  NEUROLOGICAL: alert, oriented, normal speech, no focal findings or movement disorder noted  Cervical Exam: Closed/Thick/High       Assessment/Plan:   SIUP at  38w2d wks    Encounter Diagnoses   Name Primary?  Prenatal care in third trimester Yes     Follow-up Disposition:  Return in about 1 week (around 8/8/2017). Chalo Gandhi       @[email or web address]@

## 2017-08-08 ENCOUNTER — ROUTINE PRENATAL (OUTPATIENT)
Dept: MIDWIFE SERVICES | Age: 32
End: 2017-08-08

## 2017-08-08 VITALS
HEART RATE: 84 BPM | SYSTOLIC BLOOD PRESSURE: 117 MMHG | WEIGHT: 202 LBS | HEIGHT: 62 IN | BODY MASS INDEX: 37.17 KG/M2 | DIASTOLIC BLOOD PRESSURE: 76 MMHG

## 2017-08-08 DIAGNOSIS — O09.893 SUPERVISION OF OTHER HIGH RISK PREGNANCY, ANTEPARTUM, THIRD TRIMESTER: Primary | ICD-10-CM

## 2017-08-08 DIAGNOSIS — Z3A.39 39 WEEKS GESTATION OF PREGNANCY: ICD-10-CM

## 2017-08-08 DIAGNOSIS — Z98.891 HX OF CESAREAN SECTION: ICD-10-CM

## 2017-08-08 NOTE — PROGRESS NOTES
Chief Complaint   Patient presents with    Routine Prenatal Visit     39w2d     Visit Vitals    /76    Pulse 84    Ht 5' 2\" (1.575 m)    Wt 202 lb (91.6 kg)    BMI 36.95 kg/m2       1. Have you been to the ER, urgent care clinic since your last visit? Hospitalized since your last visit? No    2. Have you seen or consulted any other health care providers outside of the [address] [place] [address] since your last visit? Include any pap smears or colon screening. No     Here today for a routine prenatal visit and she has no complaints or concerns.

## 2017-08-08 NOTE — PATIENT INSTRUCTIONS
Thank you for choosing [facility]. We know you have many options when it comes to your healthcare; we appreciate that you picked us. Our goal is to provide exceptional service and world class care for every patient. You may receive a survey in the mail or by email asking for your feedback. Please take a few minutes to share your thoughts about your recent visit. Your comments helps us understand what we do well and what we can do better. To ensure confidentiality, this survey is administered by an independent third-party, [address] [place] [address] participation will help us to continue and improve the quality of care that we provide to you, your family, friends, and neighbors. Thank you! Week 39 of Your Pregnancy: Care Instructions  Your Care Instructions    During these final weeks, you may feel anxious to see your new baby.  babies often look different from what you see in pictures or movies. Right after birth, their heads may have a strange shape. Their eyes may be puffy. And their genitals may be swollen. They may also have very dry skin, or red marks on the eyelids, nose, or neck. Still, most parents think their babies are beautiful. Follow-up care is a key part of your treatment and safety. Be sure to make and go to all appointments, and call your doctor if you are having problems. It's also a good idea to know your test results and keep a list of the medicines you take. How can you care for yourself at home? Prepare to breastfeed  · If you are breastfeeding, continue to eat healthy foods. · Avoid alcohol, cigarettes, and drugs. This includes prescription and over-the-counter medicines. · You can help prevent sore nipples if you feed your baby in the correct position. Nurses will help you learn to do this. · Your  will need to be fed about every 1½ to 3 hours.   Choose the right birth control after your baby is born  · Women who are breastfeeding can still get pregnant. Use birth control if you don't want to get pregnant. · Intrauterine devices (IUDs) work for women who want to wait at least 2 years before getting pregnant again. They are safe to use while you are breastfeeding. · Depo-Provera can be used while you are breastfeeding. It is a shot you get every 3 months. · Birth control pills work well. But you need a different kind of pill while you are breastfeeding. And when you start taking these pills, you need to make sure to use another type of birth control until you start your second pack. · Diaphragms, cervical caps, tubal implants, and condoms with spermicide work less well after birth. If you have a diaphragm or cervical cap, you will need to have it refitted. · Tubal ligation (tying your tubes) and vasectomy are both permanent. These are good options if you are sure you are done having children. Where can you learn more? Go to http://addison-cosme.info/. Enter T248 in the search box to learn more about \"Week 39 of Your Pregnancy: Care Instructions. \"  Current as of: March 16, 2017  Content Version: 11.3  © 0774-8430 [employer]. Care instructions adapted under license by [employer] (which disclaims liability or warranty for this information). If you have questions about a medical condition or this instruction, always ask your healthcare professional. [address] any warranty or liability for your use of this information.

## 2017-08-13 ENCOUNTER — ANESTHESIA EVENT (OUTPATIENT)
Dept: LABOR AND DELIVERY | Age: 32
End: 2017-08-13
Payer: COMMERCIAL

## 2017-08-13 ENCOUNTER — ANESTHESIA (OUTPATIENT)
Dept: LABOR AND DELIVERY | Age: 32
End: 2017-08-13
Payer: COMMERCIAL

## 2017-08-13 ENCOUNTER — HOSPITAL ENCOUNTER (INPATIENT)
Age: 32
LOS: 2 days | Discharge: HOME | End: 2017-08-15
Attending: OBSTETRICS & GYNECOLOGY | Admitting: OBSTETRICS & GYNECOLOGY
Payer: COMMERCIAL

## 2017-08-13 PROBLEM — Z34.90 PREGNANCY: Status: ACTIVE | Noted: 2017-08-13

## 2017-08-13 LAB
BASOPHILS # BLD AUTO: 0 K/UL (ref 0–0.1)
BASOPHILS # BLD: 0 % (ref 0–1)
DAILY QC (YES/NO)?: YES
EOSINOPHIL # BLD: 0 K/UL (ref 0–0.4)
EOSINOPHIL NFR BLD: 0 % (ref 0–7)
ERYTHROCYTE [DISTWIDTH] IN BLOOD BY AUTOMATED COUNT: 15.5 % (ref 11.5–14.5)
HCT VFR BLD AUTO: 35.3 % (ref 35–47)
HGB BLD-MCNC: 11.9 G/DL (ref 11.5–16)
LYMPHOCYTES # BLD AUTO: 14 % (ref 12–49)
LYMPHOCYTES # BLD: 1.6 K/UL (ref 0.8–3.5)
MCH RBC QN AUTO: 29 PG (ref 26–34)
MCHC RBC AUTO-ENTMCNC: 33.7 G/DL (ref 30–36.5)
MCV RBC AUTO: 85.9 FL (ref 80–99)
MONOCYTES # BLD: 0.6 K/UL (ref 0–1)
MONOCYTES NFR BLD AUTO: 5 % (ref 5–13)
NEUTS SEG # BLD: 9.1 K/UL (ref 1.8–8)
NEUTS SEG NFR BLD AUTO: 81 % (ref 32–75)
PH, VAGINAL FLUID: 5 (ref 5–6.1)
PLATELET # BLD AUTO: 225 K/UL (ref 150–400)
RBC # BLD AUTO: 4.11 M/UL (ref 3.8–5.2)
WBC # BLD AUTO: 11.3 K/UL (ref 3.6–11)

## 2017-08-13 PROCEDURE — 74011250637 HC RX REV CODE- 250/637: Performed by: STUDENT IN AN ORGANIZED HEALTH CARE EDUCATION/TRAINING PROGRAM

## 2017-08-13 PROCEDURE — 0HQ9XZZ REPAIR PERINEUM SKIN, EXTERNAL APPROACH: ICD-10-PCS | Performed by: OBSTETRICS & GYNECOLOGY

## 2017-08-13 PROCEDURE — 99284 EMERGENCY DEPT VISIT MOD MDM: CPT | Performed by: OBSTETRICS & GYNECOLOGY

## 2017-08-13 PROCEDURE — 77010026064 HC OXYGEN INFANT MED AIR MIN: Performed by: OBSTETRICS & GYNECOLOGY

## 2017-08-13 PROCEDURE — 77030014125 HC TY EPDRL BBMI -B: Performed by: ANESTHESIOLOGY

## 2017-08-13 PROCEDURE — 74011250636 HC RX REV CODE- 250/636: Performed by: ANESTHESIOLOGY

## 2017-08-13 PROCEDURE — 75410000002 HC LABOR FEE PER 1 HR: Performed by: OBSTETRICS & GYNECOLOGY

## 2017-08-13 PROCEDURE — 74011250636 HC RX REV CODE- 250/636: Performed by: STUDENT IN AN ORGANIZED HEALTH CARE EDUCATION/TRAINING PROGRAM

## 2017-08-13 PROCEDURE — 77030034850

## 2017-08-13 PROCEDURE — 4A1HXCZ MONITORING OF PRODUCTS OF CONCEPTION, CARDIAC RATE, EXTERNAL APPROACH: ICD-10-PCS | Performed by: OBSTETRICS & GYNECOLOGY

## 2017-08-13 PROCEDURE — 75410000000 HC DELIVERY VAGINAL/SINGLE: Performed by: OBSTETRICS & GYNECOLOGY

## 2017-08-13 PROCEDURE — 83986 ASSAY PH BODY FLUID NOS: CPT | Performed by: OBSTETRICS & GYNECOLOGY

## 2017-08-13 PROCEDURE — 77030018749 HC HK AMNIO DISP DERY -A

## 2017-08-13 PROCEDURE — 99218 HC RM OBSERVATION: CPT

## 2017-08-13 PROCEDURE — 00HU33Z INSERTION OF INFUSION DEVICE INTO SPINAL CANAL, PERCUTANEOUS APPROACH: ICD-10-PCS | Performed by: ANESTHESIOLOGY

## 2017-08-13 PROCEDURE — 85025 COMPLETE CBC W/AUTO DIFF WBC: CPT

## 2017-08-13 PROCEDURE — 74011250636 HC RX REV CODE- 250/636: Performed by: OBSTETRICS & GYNECOLOGY

## 2017-08-13 PROCEDURE — 65270000029 HC RM PRIVATE

## 2017-08-13 PROCEDURE — 10907ZC DRAINAGE OF AMNIOTIC FLUID, THERAPEUTIC FROM PRODUCTS OF CONCEPTION, VIA NATURAL OR ARTIFICIAL OPENING: ICD-10-PCS | Performed by: OBSTETRICS & GYNECOLOGY

## 2017-08-13 PROCEDURE — 59025 FETAL NON-STRESS TEST: CPT | Performed by: OBSTETRICS & GYNECOLOGY

## 2017-08-13 PROCEDURE — 74011000250 HC RX REV CODE- 250

## 2017-08-13 PROCEDURE — 76060000078 HC EPIDURAL ANESTHESIA: Performed by: ANESTHESIOLOGY

## 2017-08-13 PROCEDURE — 75410000003 HC RECOV DEL/VAG/CSECN EA 0.5 HR: Performed by: OBSTETRICS & GYNECOLOGY

## 2017-08-13 PROCEDURE — 36415 COLL VENOUS BLD VENIPUNCTURE: CPT

## 2017-08-13 RX ORDER — ONDANSETRON 2 MG/ML
4 INJECTION INTRAMUSCULAR; INTRAVENOUS
Status: DISCONTINUED | OUTPATIENT
Start: 2017-08-13 | End: 2017-08-15 | Stop reason: HOSPADM

## 2017-08-13 RX ORDER — DIPHENHYDRAMINE HYDROCHLORIDE 50 MG/ML
12.5 INJECTION, SOLUTION INTRAMUSCULAR; INTRAVENOUS
Status: DISCONTINUED | OUTPATIENT
Start: 2017-08-13 | End: 2017-08-15 | Stop reason: HOSPADM

## 2017-08-13 RX ORDER — OXYCODONE AND ACETAMINOPHEN 5; 325 MG/1; MG/1
1 TABLET ORAL
Status: DISCONTINUED | OUTPATIENT
Start: 2017-08-13 | End: 2017-08-15 | Stop reason: HOSPADM

## 2017-08-13 RX ORDER — SODIUM CHLORIDE, SODIUM LACTATE, POTASSIUM CHLORIDE, CALCIUM CHLORIDE 600; 310; 30; 20 MG/100ML; MG/100ML; MG/100ML; MG/100ML
125 INJECTION, SOLUTION INTRAVENOUS CONTINUOUS
Status: DISCONTINUED | OUTPATIENT
Start: 2017-08-13 | End: 2017-08-15 | Stop reason: HOSPADM

## 2017-08-13 RX ORDER — SODIUM CHLORIDE 0.9 % (FLUSH) 0.9 %
5-10 SYRINGE (ML) INJECTION AS NEEDED
Status: DISCONTINUED | OUTPATIENT
Start: 2017-08-13 | End: 2017-08-15 | Stop reason: HOSPADM

## 2017-08-13 RX ORDER — SWAB
1 SWAB, NON-MEDICATED MISCELLANEOUS DAILY
Status: DISCONTINUED | OUTPATIENT
Start: 2017-08-14 | End: 2017-08-15 | Stop reason: HOSPADM

## 2017-08-13 RX ORDER — SODIUM CHLORIDE 0.9 % (FLUSH) 0.9 %
5-10 SYRINGE (ML) INJECTION EVERY 8 HOURS
Status: DISCONTINUED | OUTPATIENT
Start: 2017-08-13 | End: 2017-08-14

## 2017-08-13 RX ORDER — OXYTOCIN IN 5 % DEXTROSE 30/500 ML
500 PLASTIC BAG, INJECTION (ML) INTRAVENOUS
Status: DISCONTINUED | OUTPATIENT
Start: 2017-08-13 | End: 2017-08-15 | Stop reason: HOSPADM

## 2017-08-13 RX ORDER — FENTANYL/BUPIVACAINE/NS/PF 2-1250MCG
1-16 PREFILLED PUMP RESERVOIR EPIDURAL CONTINUOUS
Status: DISCONTINUED | OUTPATIENT
Start: 2017-08-13 | End: 2017-08-13 | Stop reason: HOSPADM

## 2017-08-13 RX ORDER — BUPIVACAINE HYDROCHLORIDE 2.5 MG/ML
INJECTION, SOLUTION EPIDURAL; INFILTRATION; INTRACAUDAL AS NEEDED
Status: DISCONTINUED | OUTPATIENT
Start: 2017-08-13 | End: 2017-08-13 | Stop reason: HOSPADM

## 2017-08-13 RX ORDER — NALOXONE HYDROCHLORIDE 0.4 MG/ML
0.4 INJECTION, SOLUTION INTRAMUSCULAR; INTRAVENOUS; SUBCUTANEOUS AS NEEDED
Status: DISCONTINUED | OUTPATIENT
Start: 2017-08-13 | End: 2017-08-13 | Stop reason: HOSPADM

## 2017-08-13 RX ORDER — IBUPROFEN 800 MG/1
800 TABLET ORAL EVERY 8 HOURS
Status: DISCONTINUED | OUTPATIENT
Start: 2017-08-13 | End: 2017-08-15 | Stop reason: HOSPADM

## 2017-08-13 RX ORDER — OXYTOCIN/RINGER'S LACTATE 20/1000 ML
125-500 PLASTIC BAG, INJECTION (ML) INTRAVENOUS ONCE
Status: ACTIVE | OUTPATIENT
Start: 2017-08-13 | End: 2017-08-14

## 2017-08-13 RX ORDER — DOCUSATE SODIUM 100 MG/1
100 CAPSULE, LIQUID FILLED ORAL
Status: DISCONTINUED | OUTPATIENT
Start: 2017-08-13 | End: 2017-08-15 | Stop reason: HOSPADM

## 2017-08-13 RX ORDER — HYDROCORTISONE ACETATE PRAMOXINE HCL 2.5; 1 G/100G; G/100G
CREAM TOPICAL AS NEEDED
Status: DISCONTINUED | OUTPATIENT
Start: 2017-08-13 | End: 2017-08-15 | Stop reason: HOSPADM

## 2017-08-13 RX ORDER — NALOXONE HYDROCHLORIDE 0.4 MG/ML
0.4 INJECTION, SOLUTION INTRAMUSCULAR; INTRAVENOUS; SUBCUTANEOUS AS NEEDED
Status: DISCONTINUED | OUTPATIENT
Start: 2017-08-13 | End: 2017-08-15 | Stop reason: HOSPADM

## 2017-08-13 RX ORDER — SIMETHICONE 80 MG
80 TABLET,CHEWABLE ORAL
Status: DISCONTINUED | OUTPATIENT
Start: 2017-08-13 | End: 2017-08-15 | Stop reason: HOSPADM

## 2017-08-13 RX ORDER — LIDOCAINE HYDROCHLORIDE AND EPINEPHRINE 20; 5 MG/ML; UG/ML
INJECTION, SOLUTION EPIDURAL; INFILTRATION; INTRACAUDAL; PERINEURAL AS NEEDED
Status: DISCONTINUED | OUTPATIENT
Start: 2017-08-13 | End: 2017-08-13 | Stop reason: HOSPADM

## 2017-08-13 RX ADMIN — MUPIROCIN: 20 OINTMENT TOPICAL at 22:00

## 2017-08-13 RX ADMIN — FENTANYL 0.2 MG/100ML-BUPIV 0.125%-NACL 0.9% EPIDURAL INJ 10 ML/HR: 2/0.125 SOLUTION at 18:47

## 2017-08-13 RX ADMIN — SODIUM CHLORIDE, SODIUM LACTATE, POTASSIUM CHLORIDE, AND CALCIUM CHLORIDE 999 ML/HR: 600; 310; 30; 20 INJECTION, SOLUTION INTRAVENOUS at 17:25

## 2017-08-13 RX ADMIN — Medication 10 ML: at 17:25

## 2017-08-13 RX ADMIN — BUPIVACAINE HYDROCHLORIDE 6 ML: 2.5 INJECTION, SOLUTION EPIDURAL; INFILTRATION; INTRACAUDAL at 18:36

## 2017-08-13 RX ADMIN — Medication 999 ML/HR: at 20:26

## 2017-08-13 RX ADMIN — LIDOCAINE HYDROCHLORIDE AND EPINEPHRINE 3 ML: 20; 5 INJECTION, SOLUTION EPIDURAL; INFILTRATION; INTRACAUDAL; PERINEURAL at 18:36

## 2017-08-13 RX ADMIN — SODIUM CHLORIDE, SODIUM LACTATE, POTASSIUM CHLORIDE, AND CALCIUM CHLORIDE 999 ML/HR: 600; 310; 30; 20 INJECTION, SOLUTION INTRAVENOUS at 18:20

## 2017-08-13 RX ADMIN — IBUPROFEN 800 MG: 800 TABLET, FILM COATED ORAL at 21:33

## 2017-08-13 NOTE — ANESTHESIA PROCEDURE NOTES
Epidural Block    Start time: 8/13/2017 6:42 PM  End time: 8/13/2017 6:42 PM  Performed by: Michelle Pearce  Authorized by: Mj OLIVAREZ     Pre-Procedure  Indication: labor epidural    Preanesthetic Checklist: patient identified, risks and benefits discussed, anesthesia consent, timeout performed and anesthesia consent      Epidural:   Patient position:  Seated  Prep region:  Lumbar  Prep: Betadine    Location:  L3-4    Needle and Epidural Catheter:   Needle Type:  Tuohy  Needle Gauge:  17 G  Injection Technique:  Loss of resistance using saline  Attempts:  1  Catheter Size:  18 G  Events: no paresthesia and negative aspiration test    Test Dose:  Lidocaine 1.5% w/ epi and negative    Assessment:   Catheter Secured:  Tegaderm and tape  Insertion:  Uncomplicated  Patient tolerance:  Patient tolerated the procedure well with no immediate complications

## 2017-08-13 NOTE — H&P
History & Physical    Name: Dinesh Philippe MRN: [id number]  SSN: [id number]    YOB: 1985  Age: 28 y.o. Sex: female        Subjective:     Estimated Date of Delivery: 17  OB History    Para Term  AB Living   4 2 1 1 0 1   SAB TAB Ectopic Molar Multiple Live Births   0 0 0  0 1      # Outcome Date GA Lbr Dominguez/2nd Weight Sex Delivery Anes PTL Lv   4             3 Term  38w0d  2.977 kg F Vag-Spont None  DIMITRIOS   2   34w0d    CS-LTranv  N FD      Birth Comments: bilateral polycystic kidney disease, oligohydraminos, pericardial effusion and hydrocephaly    1                    Ms. Tonia Car is seen with pregnancy at 40w0d for c/o possible SROM and ctx . Prenatal course was normal. Please see prenatal records for details. No past medical history on file. Past Surgical History:   Procedure Laterality Date     DELIVERY ONLY  may 2008     Social History     Occupational History    Not on file. Social History Main Topics    Smoking status: Never Smoker    Smokeless tobacco: Never Used    Alcohol use No    Drug use: No    Sexual activity: Yes     Partners: Male     Family History   Problem Relation Age of Onset    Diabetes Mother     Hypertension Mother        No Known Allergies  Prior to Admission medications    Medication Sig Start Date End Date Taking? Authorizing Provider   ferrous sulfate (IRON) 325 mg (65 mg iron) tablet Take  by mouth Daily (before breakfast). Historical Provider   ascorbic acid, vitamin C, (VITAMIN C) 500 mg tablet Take  by mouth. Historical Provider   cholecalciferol, vitamin D3, (VITAMIN D3) 2,000 unit tab Take  by mouth. Historical Provider   prenatal vit-calcium-iron-fa (PRENATAL PLUS WITH CALCIUM) 27 mg iron- 1 mg tab Take 1 Tab by mouth daily.  17   Dara Dover MD        Review of Systems: Constitutional: negative  Respiratory: negative  Cardiovascular: negative  Gastrointestinal: negative  Genitourinary:negative  Integument/breast: negative  Hematologic/lymphatic: negative  Musculoskeletal:negative  Neurological: negative  Behavioral/Psych: negative    Objective:     Vitals: There were no vitals filed for this visit. Physical Exam:  Patient without distress. Heart: Regular rate and rhythm  Lung: clear to auscultation throughout lung fields and normal respiratory effort  Abdomen: soft, nontender  Fundus: soft and non tender  Perineum: blood absent, amniotic fluid absent  Cervical Exam: 3/70/-2  Membranes:  Intact  Fetal Heart Rate: cat I tracing without decels ctx q 5-7 minutes    Prenatal Labs:   Lab Results   Component Value Date/Time    GrBStrep, External NEGATIVE 2017         Assessment/Plan:     Active Problems:    Pregnancy (2017)         Plan: patient G4 P 1 ( TAB 34 week loss from pckd (c/s)) FD .   Patient has had successful  Will walk for 2 hrs and recheck GBS negative  Monitor     Signed By:  Miriam Lemus MD     2017

## 2017-08-13 NOTE — PROGRESS NOTES
2:03 PM  Patient is a , at 37wks gestation, patient of Dr. Corrine Okeefe, arrive to r/o SROM. Patient is a previous c/s x1 and successful vbacx1, per pt. Patient placed on the monitor, and oriented to caregivers. VSS. Patient also c/o cx's. Patient denies vaginal bleeding; pt confirms active fetal movements. Will continue to monitor pt. 14:20PM  Nitrazine test performed and Result is Negative. 14:25 PM  Dr. Michele Snow in room to assess the pt. Dr. Michele Snow made aware pt Nitrazine test result is Negative. Amnisure test cancelled as MD performs SVE, pt is 370/-2, BBOW. Patient to ambulate halls after Reactive NST, per Dr. Michele Snow. 15:14 PM  Monitor off; pt ambulating halls, as per Dr. Michele Snow. 17:05PM  Patient placed back to the monitor. SVE to be performed momentarily by Dr. Michele Snow. Will continue to monitor pt. 18:48PM  Epidural placed by Dr. Madelyn Marmolejo, pt tolerated procedure well and now reports pain relief. Will continue to monitor pt.    19:00PM  Bedside and Verbal shift change report given to Belem Aquino RN (oncoming nurse) by Tavia Brito RN (offgoing nurse). Report included the following information SBAR, Kardex, Procedure Summary, Intake/Output, MAR, Recent Results and Med Rec Status.

## 2017-08-13 NOTE — H&P
History & Physical    Name: Wilmot Heimlich MRN: [id number]  SSN: [id number]    YOB: 1985  Age: 28 y.o. Sex: female      Subjective:     Reason for Admission:  Pregnancy and rule-out labor    History of Present Illness: Ms. Kaylene Aldrich is a 28 y.o. [id number]  female with an estimated gestational age of 37w0d with Estimated Date of Delivery: 17. Pt reports that around 0700 she saw some bleeding and felt some LOF. She began having contractions every 15 minutes. Since then contractions have progressed in intensity and frequency--now every 5-6 minutes. Patient reports some vaginal pressure and contractions with positive fetal movement. Pregnancy has been complicated by previous pregnancies with trisomy 25 resulting in  demise of  infant and elective  for determined poor prognosis of another. Patient denies abdominal pain  , chest pain, headache , nausea and vomiting, shortness of breath, swelling, vaginal bleeding , vaginal leaking of fluid  and visual disturbances. OB History    Para Term  AB Living   4 2 1 1 0 1   SAB TAB Ectopic Molar Multiple Live Births   0 0 0  0 1      # Outcome Date GA Lbr Dominguez/2nd Weight Sex Delivery Anes PTL Lv   4             3 Term  38w0d  2.977 kg F Vag-Spont None  DIMITRIOS   2   34w0d    CS-LTranv  N FD      Birth Comments: bilateral polycystic kidney disease, oligohydraminos, pericardial effusion and hydrocephaly    1                  No past medical history on file. Past Surgical History:   Procedure Laterality Date     DELIVERY ONLY  may 2008     Social History     Occupational History    Not on file.      Social History Main Topics    Smoking status: Never Smoker    Smokeless tobacco: Never Used    Alcohol use No    Drug use: No    Sexual activity: Yes     Partners: Male      Family History   Problem Relation Age of Onset    Diabetes Mother     Hypertension Mother        No Known Allergies  Prior to Admission medications    Medication Sig Start Date End Date Taking? Authorizing Provider   ferrous sulfate (IRON) 325 mg (65 mg iron) tablet Take  by mouth Daily (before breakfast). Historical Provider   ascorbic acid, vitamin C, (VITAMIN C) 500 mg tablet Take  by mouth. Historical Provider   cholecalciferol, vitamin D3, (VITAMIN D3) 2,000 unit tab Take  by mouth. Historical Provider   prenatal vit-calcium-iron-fa (PRENATAL PLUS WITH CALCIUM) 27 mg iron- 1 mg tab Take 1 Tab by mouth daily. 2/6/17   Twan Marinelli MD        Review of Systems:  A comprehensive review of systems was negative except for that written in the History of Present Illness. Objective:     Vitals: There were no vitals filed for this visit. No data recorded. No Data Recorded     Physical Exam:  Patient without distress. Heart: Regular rate and rhythm or S1S2 present  Lung: clear to auscultation throughout lung fields, no wheezes, no rales, no rhonchi and normal respiratory effort  Abdomen: soft, nontender  Fundus: soft and non tender  Cervical Exam: 3/70/-2  Lower Extremities: No edema     Membranes:  Intact  Uterine Activity:  Every 4-5 minutes  Fetal Heart Rate:  Reactive  Baseline: 130 per minute  Variability: moderate  Accelerations: yes  Decelerations: none       Lab/Data Review:  No results found for this or any previous visit (from the past 24 hour(s)). Assessment and Plan:     Ms. Tamar Salinas is a 28 y.o. [id number]  female with an estimated gestational age of 37w0d who is admitted for observation and rule-out pregnancy. 1. Admit for observation  2.  Monitor on TOCO  3. Nitrazine negative, amnisure unnecessary in setting of ballottable head on exam.  4. Walk and reassess cervical exam    Patient seen and discussed with Dr. Cherelle Chavis (attending)    Promise Rodriguez MD  Family Medicine Resident

## 2017-08-13 NOTE — IP AVS SNAPSHOT
[place] 
 
 
 [address] [phone] Patient: Reyna Webb MRN: [id number] :1985 You are allergic to the following No active allergies Recent Documentation Height Weight Breastfeeding? BMI OB Status Smoking Status 1.575 m 90.3 kg Yes 36.4 kg/m2 Recent pregnancy Never Smoker Unresulted Labs Order Current Status SAMPLE TO BLOOD BANK In process PH BY NITRAZINE, POC Preliminary result Emergency Contacts Name Discharge Info Relation Home Work Mobile Tiana Olivares  Spouse [3] [phone] About your hospitalization You were admitted on:  2017 You last received care in the:  [facility] You were discharged on:  August 15, 2017 Unit phone number:  [phone] Why you were hospitalized Your primary diagnosis was:  Not on File Your diagnoses also included:  Pregnancy Providers Seen During Your Hospitalizations Provider Role Specialty Primary office phone Aron Bill MD Attending Provider Obstetrics & Gynecology [phone] Cristi Benavidez MD Attending Provider Obstetrics & Gynecology [phone] Your Primary Care Physician (PCP) Primary Care Physician Office Phone Office Fax Sophy Strongippo [phone] Follow-up Information Follow up With Details Comments Contact Info Aron Bill MD On 2017 postpartum visit at 1:00p [address] [facility] [address] [phone] Debbie [facility], [address]  [address] [phone] Your Appointments 2017  1:00 PM EDT POSTPARTUM VISIT with Aron Bill MD  
[address]) [address] [facility] [address] [place] [address] [phone] Current Discharge Medication List  
  
START taking these medications Dose & Instructions Dispensing Information Comments Morning Noon Evening Bedtime  
 docusate sodium 100 mg capsule Commonly known as:  Dimple Grier Your last dose was: Your next dose is:    
   
   
 Dose:  100 mg Take 1 Cap by mouth daily. Quantity:  90 Cap Refills:  1  
     
   
   
   
  
 ibuprofen 800 mg tablet Commonly known as:  MOTRIN Your last dose was: Your next dose is:    
   
   
 Dose:  800 mg Take 1 Tab by mouth every eight (8) hours as needed for Pain. Quantity:  20 Tab Refills:  0 CONTINUE these medications which have NOT CHANGED Dose & Instructions Dispensing Information Comments Morning Noon Evening Bedtime Iron 325 mg (65 mg iron) tablet Generic drug:  ferrous sulfate Your last dose was: Your next dose is: Take  by mouth Daily (before breakfast). Refills:  0  
     
   
   
   
  
 prenatal vit-calcium-iron-fa 27 mg iron- 1 mg Tab Commonly known as:  PRENATAL PLUS with CALCIUM Your last dose was: Your next dose is:    
   
   
 Dose:  1 Tab Take 1 Tab by mouth daily. Quantity:  90 Tab Refills:  4 VITAMIN C 500 mg tablet Generic drug:  ascorbic acid (vitamin C) Your last dose was: Your next dose is: Take  by mouth. Refills:  0  
     
   
   
   
  
 VITAMIN D3 2,000 unit Tab Generic drug:  cholecalciferol (vitamin D3) Your last dose was: Your next dose is: Take  by mouth. Refills:  0 Where to Get Your Medications Information on where to get these meds will be given to you by the nurse or doctor. ! Ask your nurse or doctor about these medications  
  docusate sodium 100 mg capsule  
 ibuprofen 800 mg tablet Discharge Instructions POST DELIVERY DISCHARGE INSTRUCTIONS Name: Bria Anne YOB: 1985 Primary Diagnosis: Active Problems: 
  Pregnancy (2017) General: Colace 100mg- take 1 Cap by mouth daily. Diet/Diet Restrictions: 
Eight 8-ounce glasses of fluid daily (water, juices); avoid excessive caffeine intake. Meals/snacks as desired which are high in fiber and carbohydrates and low in fat and cholesterol. Physical Activity / Restrictions / Safety:  
 
Avoid heavy lifting, no more that 8 lbs. For 2-3 weeks; limit use of stairs to 2 times daily for the first week home. No driving for one week. Avoid intercourse 4-6 weeks, no douching or tampon use. Check with obstetrician before starting or resuming an exercise program.    
 
 
Discharge Instructions/Special Treatment/Home Care Needs:  
 
Continue prenatal vitamins. Continue to use squirt bottle with warm water on your episiotomy after each bathroom use until bleeding stops. If steri-strips applied to your incision, remove in 7-10 days. Call your doctor for the following:  
 
Fever over 101 degrees by mouth. Vaginal bleeding heavier than a normal menstrual period or clot larger than a golf ball. Red streaks or increased swelling of legs, painful red streaks on your breast. 
Painful urination, constipation and increased pain or swelling or discharge with your incision. If you feel extremely anxious or overwhelmed. If you have thoughts of harming yourself and/or your baby. Pain Management: Motrin 800mg - take 1 tablet by mouth every 8 hours as needed for pain. Pain Management:  
Take Acetaminophen (Tylenol) or Ibuprofen (Advil, Motrin), as directed for pain. Use a warm Sitz bath 3 times daily to relieve episiotomy or hemorrhoidal discomfort. Heating pad to  incision as needed. For hemorrhoidal discomfort, use Tucks and Anusol cream as needed and directed. Follow-Up Care: Call for 6 week check up appointment. These are general instructions for a healthy lifestyle: No smoking/ No tobacco products/ Avoid exposure to second hand smoke Surgeon General's Warning:  Quitting smoking now greatly reduces serious risk to your health. Obesity, smoking, and sedentary lifestyle greatly increases your risk for illness A healthy diet, regular physical exercise & weight monitoring are important for maintaining a healthy lifestyle Recognize signs and symptoms of STROKE: 
 
F-face looks uneven A-arms unable to move or move unevenly S-speech slurred or non-existent T-time-call 911 as soon as signs and symptoms begin-DO NOT go Back to bed or wait to see if you get better-TIME IS BRAIN. Signed By: Krystin Hoang RN                                                                                                   Date: 8/15/2017 Time: 11:52 AM 
 
 
After Your Delivery (the Postpartum Period): After Your Visit Your Care Instructions Congratulations on the birth of your baby. Like pregnancy, the  period can be a time of excitement, lachelle, and exhaustion. You may look at your wondrous little baby and feel happy. You may also be overwhelmed by your new sleep hours and new responsibilities. At first, babies often sleep during the days and are awake at night. They do not have a pattern or routine. They may make sudden gasps, jerk themselves awake, or look like they have crossed eyes. These are all normal, and they may even make you smile. In these first weeks after delivery, try to take good care of yourself. It may take 4 to 6 weeks to feel like yourself again, and possibly longer if you had a  birth. You will likely feel very tired for several weeks. Your days will be full of ups and downs, but lots of lachelle as well. Follow-up care is a key part of your treatment and safety.  Be sure to make and go to all appointments, and call your doctor if you are having problems. It's also a good idea to know your test results and keep a list of the medicines you take. How can you care for yourself at home? Take care of your body after delivery · Use pads instead of tampons for the bloody flow that may last as long as 2 weeks. · Ease cramps with ibuprofen (Advil, Motrin). · Ease soreness of hemorrhoids and the area between your vagina and rectum with ice compresses or witch hazel pads. · Ease constipation by drinking lots of fluid and eating high-fiber foods. Ask your doctor about over-the-counter stool softeners. · Cleanse yourself with a gentle squeeze of warm water from a bottle instead of wiping with toilet paper. · Take a sitz bath in warm water several times a day. · Wear a good nursing bra. Ease sore and swollen breasts with warm, wet washcloths. · If you are not breast-feeding, use ice rather than heat for breast soreness. · Your period may not start for several months if you are breast-feeding. You may bleed more, and longer at first, than you did before you got pregnant. · Wait until you are healed (about 4 to 6 weeks) before you have sexual intercourse. Your doctor will tell you when it is okay to have sex. · Try not to travel with your baby for 5 or 6 weeks. If you take a long car trip, make frequent stops to walk around and stretch. Avoid exhaustion · Rest every day. Try to nap when your baby naps. · Ask another adult to be with you for a few days after delivery. · Plan for  if you have other children. · Stay flexible so you can eat at odd hours and sleep when you need to. Both you and your baby are making new schedules. · Plan small trips to get out of the house. Change can make you feel less tired. · Ask for help with housework, cooking, and shopping. Remind yourself that your job is to care for your baby. Know about help for postpartum depression · \"Baby blues\" are common for the first 1 to 2 weeks after birth.  You may cry or feel sad or irritable for no reason. · Rest whenever you can. Being tired makes it harder to handle your emotions. · Go for walks with your baby. · Talk to your partner, friends, and family about your feelings. · If your symptoms last for more than a few weeks, or if you feel very depressed, ask your doctor for help. · Postpartum depression can be treated. Support groups and counseling can help. Sometimes medicine can also help. Stay healthy · Eat healthy foods so you have more energy, make good breast milk, and lose extra baby pounds. · If you breast-feed, avoid alcohol and drugs. Stay smoke-free. If you quit during pregnancy, congratulations. · Start daily exercise after 4 to 6 weeks, but rest when you feel tired. · Learn exercises to tone your belly. Do Kegel exercises to regain strength in your pelvic muscles. You can do these exercises while you stand or sit. ¨ Squeeze the same muscles you would use to stop your urine. Your belly and rear end (buttocks) should not move. ¨ Hold the squeeze for 3 seconds, then relax for 3 seconds. ¨ Repeat the exercise 10 to 15 times for each session. Do three or more sessions each day. · Find a class for new mothers and new babies that has an exercise time. · If you had a  birth, give yourself a bit more time before you exercise, and be careful. When should you call for help? Call 911 anytime you think you may need emergency care. For example, call if: 
· You passed out (lost consciousness). Call your doctor now or seek immediate medical care if: 
· You have severe vaginal bleeding. This means you are passing blood clots and soaking through a pad each hour for 2 or more hours. · You are dizzy or lightheaded, or you feel like you may faint. · You have a fever. · You have new belly pain, or your pain gets worse. Watch closely for changes in your health, and be sure to contact your doctor if: · Your vaginal bleeding seems to be getting heavier. · You have new or worse vaginal discharge. · You feel sad, anxious, or hopeless for more than a few days. · You do not get better as expected. Where can you learn more? Go to Ocean Butterflies.be Enter A461 in the search box to learn more about \"After Your Delivery (the Postpartum Period): After Your Visit. \"  
© 8618-1812 Healthwise, Incorporated. Care instructions adapted under license by Lindsey Contreras (which disclaims liability or warranty for this information). This care instruction is for use with your licensed healthcare professional. If you have questions about a medical condition or this instruction, always ask your healthcare professional. [address] any warranty or liability for your use of this information. Content Version: 0.5.943075; Last Revised: 2012 Raidarrr Activation Thank you for requesting access to Raidarrr. Please follow the instructions below to securely access and download your online medical record. Raidarrr allows you to send messages to your doctor, view your test results, renew your prescriptions, schedule appointments, and more. How Do I Sign Up? 1. In your internet browser, go to [email or web address] 
2. Click on the First Time User? Click Here link in the Sign In box. You will be redirect to the New Member Sign Up page. 3. Enter your Raidarrr Access Code exactly as it appears below. You will not need to use this code after youve completed the sign-up process. If you do not sign up before the expiration date, you must request a new code. Raidarrr Access Code: [email or web address] Expires: 2017 12:16 PM (This is the date your Raidarrr access code will ) 4. Enter the last four digits of your Social Security Number (xxxx) and Date of Birth (mm/dd/yyyy) as indicated and click Submit. You will be taken to the next sign-up page. 5. Create a Zipalong ID. This will be your Zipalong login ID and cannot be changed, so think of one that is secure and easy to remember. 6. Create a Zipalong password. You can change your password at any time. 7. Enter your Password Reset Question and Answer. This can be used at a later time if you forget your password. 8. Enter your e-mail address. You will receive e-mail notification when new information is available in [address] 9. Click Sign Up. You can now view and download portions of your medical record. 10. Click the Download Summary menu link to download a portable copy of your medical information. Additional Information If you have questions, please visit the Frequently Asked Questions section of the Zipalong website at [email or web address] Remember, Zipalong is NOT to be used for urgent needs. For medical emergencies, dial 911. Discharge Orders None Zipalong Announcement We are excited to announce that we are making your provider's discharge notes available to you in Zipalong. You will see these notes when they are completed and signed by the physician that discharged you from your recent hospital stay. If you have any questions or concerns about any information you see in Zipalong, please call the Health Information Department where you were seen or reach out to your Primary Care Provider for more information about your plan of care. Introducing [facility]! Wiliam Stauffer introduces Zipalong patient portal. Now you can access parts of your medical record, email your doctor's office, and request medication refills online. 1. In your internet browser, go to [email or web address] 2. Click on the First Time User? Click Here link in the Sign In box. You will see the New Member Sign Up page. 3. Enter your Zipalong Access Code exactly as it appears below.  You will not need to use this code after youve completed the sign-up process. If you do not sign up before the expiration date, you must request a new code. · eblizz Access Code: [email or web address] Expires: 8/20/2017 12:16 PM 
 
4. Enter the last four digits of your Social Security Number (xxxx) and Date of Birth (mm/dd/yyyy) as indicated and click Submit. You will be taken to the next sign-up page. 5. Create a eblizz ID. This will be your eblizz login ID and cannot be changed, so think of one that is secure and easy to remember. 6. Create a eblizz password. You can change your password at any time. 7. Enter your Password Reset Question and Answer. This can be used at a later time if you forget your password. 8. Enter your e-mail address. You will receive e-mail notification when new information is available in [address] 9. Click Sign Up. You can now view and download portions of your medical record. 10. Click the Download Summary menu link to download a portable copy of your medical information. If you have questions, please visit the Frequently Asked Questions section of the eblizz website. Remember, eblizz is NOT to be used for urgent needs. For medical emergencies, dial 911. Now available from your iPhone and Android! General Information Please provide this summary of care documentation to your next provider. Patient Signature:  ____________________________________________________________ Date:  ____________________________________________________________  
  
Aneesh Cano Provider Signature:  ____________________________________________________________ Date:  ____________________________________________________________

## 2017-08-13 NOTE — IP AVS SNAPSHOT
Jennifer Melendez 
 
 
 [address] [place] [address] 
[phone] Patient: Rafaela Toth MRN: [id number] :1985 Current Discharge Medication List  
  
START taking these medications Dose & Instructions Dispensing Information Comments Morning Noon Evening Bedtime  
 docusate sodium 100 mg capsule Commonly known as:  Fabi Silk Your last dose was: Your next dose is:    
   
   
 Dose:  100 mg Take 1 Cap by mouth daily. Quantity:  90 Cap Refills:  1  
     
   
   
   
  
 ibuprofen 800 mg tablet Commonly known as:  MOTRIN Your last dose was: Your next dose is:    
   
   
 Dose:  800 mg Take 1 Tab by mouth every eight (8) hours as needed for Pain. Quantity:  20 Tab Refills:  0 CONTINUE these medications which have NOT CHANGED Dose & Instructions Dispensing Information Comments Morning Noon Evening Bedtime Iron 325 mg (65 mg iron) tablet Generic drug:  ferrous sulfate Your last dose was: Your next dose is: Take  by mouth Daily (before breakfast). Refills:  0  
     
   
   
   
  
 prenatal vit-calcium-iron-fa 27 mg iron- 1 mg Tab Commonly known as:  PRENATAL PLUS with CALCIUM Your last dose was: Your next dose is:    
   
   
 Dose:  1 Tab Take 1 Tab by mouth daily. Quantity:  90 Tab Refills:  4 VITAMIN C 500 mg tablet Generic drug:  ascorbic acid (vitamin C) Your last dose was: Your next dose is: Take  by mouth. Refills:  0  
     
   
   
   
  
 VITAMIN D3 2,000 unit Tab Generic drug:  cholecalciferol (vitamin D3) Your last dose was: Your next dose is: Take  by mouth. Refills:  0 Where to Get Your Medications Information on where to get these meds will be given to you by the nurse or doctor. ! Ask your nurse or doctor about these medications  
  docusate sodium 100 mg capsule  
 ibuprofen 800 mg tablet

## 2017-08-13 NOTE — PROGRESS NOTES
Labor Progress Note  Patient seen, fetal heart rate and contraction pattern evaluated, patient examined. Reports current contractions are what she felt when she was fully dilated in previous pregnancies. Requesting epidural.  No data found. Physical Exam:  Cervical Exam:  Cervical Exam  Dilation (cm): 5  Eff: 80 %  Station: -1  Position: Anterior  Cervical Consistency: Soft  Vaginal exam done by? : Dr. Jessica Rod: Vertex  FHR Interpretation: Overall pattern reassuring  Membranes:  Intact  Uterine Activity: every 2-3 minutes  Fetal Heart Rate: Reactive  Baseline: 135 per minute  Variability: moderate  Accelerations: yes  Decelerations: early    Assessment/Plan:  Pooja Fofana is a 28 y.o. [id number] at 40w0d admitted for management of active labor. 1. Consult to anesthesiology for epidural  2. Continue to monitor with expectant management. Patient seen and discussed with Dr. Leanne Lizarraga (attending).   Home Villarreal MD  Family Medicine Resident

## 2017-08-13 NOTE — ANESTHESIA PREPROCEDURE EVALUATION
Anesthetic History   No history of anesthetic complications            Review of Systems / Medical History  Patient summary reviewed, nursing notes reviewed and pertinent labs reviewed    Pulmonary  Within defined limits                 Neuro/Psych   Within defined limits           Cardiovascular  Within defined limits                Exercise tolerance: >4 METS     GI/Hepatic/Renal  Within defined limits              Endo/Other  Within defined limits           Other Findings                   Anesthetic Plan    ASA: 2  Anesthesia type: epidural            Anesthetic plan and risks discussed with: Patient

## 2017-08-13 NOTE — PROGRESS NOTES
ve 5/80/-1/Ib  Cat I tracing without decels ctx q 3-4 minutes  Admit for labor   Patient requesting epidural for pain   monitor

## 2017-08-13 NOTE — PROGRESS NOTES
Labor Progress Note  Patient seen, fetal heart rate and contraction pattern evaluated, patient examined. Epidural in place. She reports that she isn't feeling pain but does continue to feel pressure. AROM at 1910, fluid clear. Patient Vitals for the past 1 hrs:   Height Weight   08/13/17 1853 5' 2\" (1.575 m) 90.3 kg (199 lb)       Physical Exam:  Cervical Exam:  Cervical Exam  Dilation (cm): 7 (Dr. Rogelio Escobar)  Eff: 100 %  Station: 0  Position: Anterior  Cervical Consistency: Soft  Vaginal exam done by? : Dr. Dougherty League: Vertex  FHR Interpretation: Overall pattern reassuring  Membrane Status: AROM  Membranes:  AROM  Uterine Activity: every 2-3 minutes  Fetal Heart Rate: Cat 1    Assessment/Plan:  Kamari Cespedes is a 28 y.o. [id number] at 40w0d admitted for active labor. 1. Continue to monitor  Patient seen with Dr. Jarod Capellan (attending).   Teresa Up MD  Family Medicine Resident

## 2017-08-14 PROCEDURE — 65270000029 HC RM PRIVATE

## 2017-08-14 PROCEDURE — 77030021125

## 2017-08-14 PROCEDURE — 74011250637 HC RX REV CODE- 250/637: Performed by: STUDENT IN AN ORGANIZED HEALTH CARE EDUCATION/TRAINING PROGRAM

## 2017-08-14 RX ADMIN — Medication 1 TABLET: at 09:21

## 2017-08-14 RX ADMIN — IBUPROFEN 800 MG: 800 TABLET, FILM COATED ORAL at 13:33

## 2017-08-14 RX ADMIN — IBUPROFEN 800 MG: 800 TABLET, FILM COATED ORAL at 21:44

## 2017-08-14 RX ADMIN — IBUPROFEN 800 MG: 800 TABLET, FILM COATED ORAL at 05:39

## 2017-08-14 NOTE — PROGRESS NOTES
2230: pt ambulated to bathroom with RN assist and steady gait. Patient voided without complication. Pericare taught. Clean ice pads and underware given to patient. Patient ambulated back to bed without incident. 2300: TRANSFER - OUT REPORT:    Verbal report given to [facility] RN(name) on [employer]  being transferred to MIU(unit) for routine progression of care       Report consisted of patients Situation, Background, Assessment and   Recommendations(SBAR). Information from the following report(s) SBAR, Kardex, Intake/Output and MAR was reviewed with the receiving nurse. Lines:   Peripheral IV 08/13/17 Left Wrist (Active)   Site Assessment Clean, dry, & intact 8/13/2017  5:25 PM   Phlebitis Assessment 0 8/13/2017  5:25 PM   Infiltration Assessment 0 8/13/2017  5:25 PM   Dressing Status Clean, dry, & intact 8/13/2017  5:25 PM   Dressing Type Transparent 8/13/2017  5:25 PM   Hub Color/Line Status Patent; Infusing;Flushed 8/13/2017  5:25 PM   Action Taken Blood drawn 8/13/2017  5:25 PM   Alcohol Cap Used Yes 8/13/2017  5:25 PM        Opportunity for questions and clarification was provided.       Patient transported with:   Registered Nurse

## 2017-08-14 NOTE — ROUTINE PROCESS
SBAR IN Report: Mother    Verbal report received from Hemalatha Peres RN (full name & credentials) on this patient, who is now being transferred from L&D (unit) for routine progression of care. The patient is not wearing a green \"Anesthesia-Duramorph\" band. Report consisted of patient's Situation, Background, Assessment and Recommendations (SBAR).  ID bands were compared with the identification form, and verified with the patient and transferring nurse. Information from the SBAR, Procedure Summary, Intake/Output, MAR, Accordion, Recent Results and Med Rec Status and the Ramin Report was reviewed with the transferring nurse; opportunity for questions and clarification provided.

## 2017-08-14 NOTE — L&D DELIVERY NOTE
Delivery Summary    Patient: Chris Cason MRN: [id number]  SSN: [id number]    YOB: 1985  Age: 28 y.o. Sex: female        Labor Events:    Labor: No    Rupture Date: 2017    Rupture Time: 7:12 PM    Rupture Type AROM    Amniotic Fluid Volume:      Amniotic Fluid Description: Clear  None    Induction: None        Augmentation: None    Labor Complications: None     Additional Complications:        Cervical Ripening:       None      Delivery Events:  Episiotomy: None    Laceration(s): First degree perineal      Repaired: Yes     Number of Repair Packets:      Suture Type and Size:         Estimated Blood Loss (ml):          Information for the patient's newbornHolly Daniel, Male [[id number]]     Delivery Summary - Baby    Delivery Date: 2017   Delivery Time: 8:24 PM   Delivery Type: , Spontaneous  Sex:  male  Gestational Age: 37w0d  Delivery Clinician:  Vin Hein  Living?: Living   Delivery Location: &D  210           APGARS  One minute Five minutes Ten minutes   Skin Color: 0    1   1    Heart Rate: 2   2    2     Reflex Irritability: 1   2    2     Muscle Tone: 1   1  1     Respiration: 0   1    2     Total: 4   7   8        Presentation: Vertex  Position:        Resuscitation Method:  Suctioning-bulb; Tactile Stimulation;PPV     Meconium Stained: None    Cord Information: 3 Vessels   Complications: Nuchal Cord With Compressions  Cord Blood Sent?:  Yes    Blood Gases Sent?:  No    Placenta:  Date/Time:   8:27 PM  Removal: Spontaneous      Appearance: Normal;Intact     [place] Measurements:  Birth Weight: 8 lb 6.8 oz (3.82 kg)    Birth Length:     Head Circumference:       Chest Circumference:      Abdominal Girth:       Other Providers:   Bro ROSE;Kip BISHOP DIANA;JOSE VEGA;VIGNESH GUILLORY;ALEXEY PINEDA;PALMIRA LOPEZ;Aneesh WOLF AMBER A Obstetrician;Primary Nurse;Nicu Nurse;Nurse Practitioner;Nursery Nurse;Charge Nurse;Tech;Resident;Primary [place] Nurse           Cord Blood Results:  Information for the patient's :  Monserrat Du, Male [[id number]]   No results found for: Melene Graver, PCTDIG, BILI, ABORHEXT, [address]    Information for the patient's newbornCraig Sola, Male [[id number]]   No results found for: APH, APCO2, APO2, AHCO3, ABEC, ABDC, O2ST, SITE, [place], PHI, [place], PO2I, HCO3I, SO2I, IBD     Information for the patient's newbornCraig Sola, Male [[id number]]   No results found for: EPHV, PCO2V, PO2V, HCO3V, O2STV, EBDV    Additional Comments: Uncomplicated  of a term live male infant, OA presentation, with APGARS 4,7,8. Tight nuchal cord that had to be clamped and cut. Placenta delivered spontaneously and intact. There was a first degree laceration repaired with 3-0 vicryl. EBL 100ml.

## 2017-08-14 NOTE — LACTATION NOTE
This note was copied from a baby's chart. Discussed with mother her plan for feeding. Reviewed the benefits of exclusive breast milk feeding during the hospital stay. Informed her of the risks of using formula to supplement in the first few days of life as well as the benefits of successful breast milk feeding; referred her to the Breastfeeding booklet about this information. She acknowledges understanding of information reviewed and states that it is her plan to breast feed her infant. Will support her choice and offer additional information as needed. Mom states will try to feed baby at 3:30.

## 2017-08-14 NOTE — ROUTINE PROCESS
Bedside and Verbal shift change report given to MAIKEL Linder (oncoming nurse) by Wild Mckeon (offgoing nurse). Report included the following information SBAR, Procedure Summary, Intake/Output, MAR, Accordion, Recent Results and Med Rec Status.

## 2017-08-14 NOTE — LACTATION NOTE
Problem: Lactation Care Plan  Goal: *Infant latching appropriately  Outcome: Progressing Towards Goal  Mom states infant has been nursing well, but sleepy at times. Discussed normal  behviors.          Goal: *Weight loss less than 10% of birth weight  Outcome: Progressing Towards Goal      Encouraged mom to attempt feeding with baby led feeding cues. Just as sucking on fingers, rooting, mouthing. Looking for 8-12 feedings in 24 hours. Don't limit baby at breast, allow baby to come of breast on it's own. Baby may want to feed  often and may increase number of feedings on second day of life. Skin to skin encouraged.        If baby doesn't nurse,  Mom should  hand express  10-20 drops of colostrum and drip into baby's mouth, or give to baby by finger feeding, cup feeding, or spoon feeding at least every 2-3 hours.          Problem: Patient Education: Go to Patient Education Activity  Goal: Patient/Family Education  Outcome: Progressing Towards Goal  Reviewed breastfeeding basics:  Supply and demand,  stomach size, early  Feeding cues, skin to skin, positioning and baby led latch-on, assymetrical latch with signs of good, deep latch vs shallow, feeding frequency and duration, and log sheet for tracking infant feedings and output. Breastfeeding Booklet and Warm line information given. Discussed typical  weight loss and the importance of infant weight checks with pediatrician 1-2 post discharge.      Hand Expression Education:  Mom comfortable  manually hand expressing her colostrum. Emphasized the importance of providing infant with valuable colostrum as infant rests skin to skin at breast.  Aware to avoid extended periods of non-feeding. Aware to offer 10-20+ drops of colostrum every 2-3 hours until infant is latching and nursing effectively.   Taught the rationale behind this low tech but highly effective evidence based practice.

## 2017-08-14 NOTE — ROUTINE PROCESS
Bedside and Verbal shift change report given to [address] (oncoming nurse) by Edna Serra RN (offgoing nurse). Report included the following information SBAR, Intake/Output and MAR.

## 2017-08-14 NOTE — PROGRESS NOTES
Post-Partum Day Number 1 Progress Note    Patient doing well post-partum without significant complaint. Pain well controlled. Lochia normal.  Ambulating to bathroom. Voiding without difficulty. No lightheaded, CP, or SOB. Vitals:  No data found. Temp (24hrs), Av.3 °F (36.8 °C), Min:98 °F (36.7 °C), Max:98.5 °F (36.9 °C)      Exam:  Patient without distress. Alert and playing with baby               CTAB, no w/r/r/c.               RRR, +S1 and S2, no m/r/g. Abdomen soft, fundus firm at an inch below the umbilicus, nontender. Perineum with normal lochia noted. Small gush of blood with fundal massage               Lower extremities:  No edema. No palpable cords or tenderness. Lab/Data Review:  No results found for this or any previous visit (from the past 12 hour(s)). Assessment and Plan:    Mary Gao is a 28 y.o. [id number] s/p  at 40 weeks 0 days. Uterus slightly less firm than at time of delivery. Hypotension noted but no corresponding tachycardia and patient is asymptomatic. Patient appears to be having uncomplicated post-partum course. 1. Continue routine perineal care and maternal education. 2. Plan discharge tomorrow if no problems occur. 3. Pain medicine for first degree laceration  4. Encouraged continued ambulation to help firm up uterus. Will continue to monitor bleeding    Patient discussed with Dr. Myra Quan (attending).                  Young Hylton MD  Family Medicine Resident

## 2017-08-15 VITALS
DIASTOLIC BLOOD PRESSURE: 58 MMHG | OXYGEN SATURATION: 98 % | HEART RATE: 80 BPM | WEIGHT: 199 LBS | HEIGHT: 62 IN | BODY MASS INDEX: 36.62 KG/M2 | TEMPERATURE: 98.9 F | SYSTOLIC BLOOD PRESSURE: 110 MMHG | RESPIRATION RATE: 16 BRPM

## 2017-08-15 PROCEDURE — 74011250637 HC RX REV CODE- 250/637: Performed by: STUDENT IN AN ORGANIZED HEALTH CARE EDUCATION/TRAINING PROGRAM

## 2017-08-15 RX ORDER — IBUPROFEN 800 MG/1
800 TABLET ORAL
Qty: 20 TAB | Refills: 0 | Status: SHIPPED | OUTPATIENT
Start: 2017-08-15 | End: 2017-09-26

## 2017-08-15 RX ORDER — DOCUSATE SODIUM 100 MG/1
100 CAPSULE, LIQUID FILLED ORAL DAILY
Qty: 90 CAP | Refills: 1 | Status: SHIPPED | OUTPATIENT
Start: 2017-08-15 | End: 2017-09-26

## 2017-08-15 RX ADMIN — OXYCODONE HYDROCHLORIDE AND ACETAMINOPHEN 1 TABLET: 5; 325 TABLET ORAL at 08:37

## 2017-08-15 RX ADMIN — IBUPROFEN 800 MG: 800 TABLET, FILM COATED ORAL at 06:35

## 2017-08-15 RX ADMIN — Medication 1 TABLET: at 08:37

## 2017-08-15 NOTE — DISCHARGE SUMMARY
Obstetrical Discharge Summary     Name: Noemi Evangelista MRN: [id number]  SSN: [id number]    YOB: 1985  Age: 28 y.o. Sex: female      Admit Date: 2017    Discharge Date: 8/15/2017     Admitting Physician: Janessa Burnham MD     Attending Physician:  Joni Almeida MD    Admission Diagnoses: Pregnancy  Pregnancy  Pregnancy  Pregnancy    Discharge Diagnoses:   Information for the patient's :  Katelin Forman, Male [[id number]]   Delivery of a 3.82 kg male infant via [address], Spontaneous on 2017 at 8:24 PM  by . Apgars were 4 and 7. Additional Diagnoses:   Hospital Problems  Date Reviewed: 7/3/2017          Codes Class Noted POA    Pregnancy ICD-10-CM: Z33.1  ICD-9-CM: V22.2  2017 Unknown             Lab Results   Component Value Date/Time    Rubella, External Immune 02/15/2017    GrBStrep, External NEGATIVE 2017       Immunization(s):   Immunization History   Administered Date(s) Administered    Influenza Vaccine (Quad) 2015    Tdap 2017        Hospital Course: Normal hospital course following the delivery. No complications. Condition at Discharge:  Stable    Disposition:  Discharge home. Physical Exam:  /58 (BP 1 Location: Left arm, BP Patient Position: At rest)  Pulse 80  Temp 98.9 °F (37.2 °C)  Resp 16  Ht 5' 2\" (1.575 m)  Wt 90.3 kg (199 lb)  LMP 2016  SpO2 98%  Breastfeeding? Yes  BMI 36.4 kg/m2    Exam:  Patient without distress. CTAB, no w/r/r/c               RRR, + S1 and S2, no m/r/g               Abdomen soft, fundus firm 2 inches below the umbilicus, non tender               Perineum with normal lochia noted. Lower extremities are negative for swelling or tenderness. Patient Instructions:   Current Discharge Medication List      CONTINUE these medications which have NOT CHANGED    Details   ferrous sulfate (IRON) 325 mg (65 mg iron) tablet Take  by mouth Daily (before breakfast).       ascorbic acid, vitamin C, (VITAMIN C) 500 mg tablet Take  by mouth. cholecalciferol, vitamin D3, (VITAMIN D3) 2,000 unit tab Take  by mouth.      prenatal vit-calcium-iron-fa (PRENATAL PLUS WITH CALCIUM) 27 mg iron- 1 mg tab Take 1 Tab by mouth daily. Qty: 90 Tab, Refills: 4      Mortin 800mg every 8 hours for pain. Reference my discharge instructions. No orders of the defined types were placed in this encounter. Patient discussed with Dr. Demarcus Cruz.     Signed By:  Aron Ku MD    Family Medicine Resident

## 2017-08-15 NOTE — PROGRESS NOTES
Bedside and Verbal shift change report given to Emilia Osuna RN (oncoming nurse) by Carrol Chandler RN (offgoing nurse). Report given with SBAR, Kardex, Intake/Output and MAR.

## 2017-08-15 NOTE — LACTATION NOTE
Problem: Lactation Care Plan  Goal: *Infant latching appropriately  Outcome: Resolved/Met Date Met:  08/15/17  Mom states nursing going well, but feels taht the baby is still hungry and is giving a supplement.      Discussed size of baby stomach and supply and demand.      Pt will successfully establish breastfeeding by feeding in response to early feeding cues   or wake every 3h, will obtain deep latch, and will keep log of feedings/output. Taught to BF at hunger cues and or q 2-3 hrs and to offer 10-20 drops of hand expressed colostrum at any non-feeds.        Breast Assessment  Left Breast: Medium  Left Nipple: Everted, Intact  Right Breast: Medium  Right Nipple: Everted, Intact  Breast- Feeding Assessment  Attends Breast-Feeding Classes: No  Breast-Feeding Experience: Yes (4 months with first.)  Breast Trauma/Surgery: No  Type/Quality: Good (per mom, not seen at breast)  Lactation Consultant Visits  Breast-Feedings: Good  (per mom, not seen at breast)             Goal: *Weight loss less than 10% of birth weight  Outcome: Resolved/Met Date Met:  08/15/17  Encouraged mom to attempt feeding with baby led feeding cues. Just as sucking on fingers, rooting, mouthing. Looking for 8-12 feedings in 24 hours. Don't limit baby at breast, allow baby to come of breast on it's own. Baby may want to feed  often and may increase number of feedings on second day of life. Skin to skin encouraged.        If baby doesn't nurse,  Mom should  Pump and give infant any expressed milk. If not pumping any milk, mom should contact pediatrician for possible need for supplementation.      Problem: Patient Education: Go to Patient Education Activity  Goal: Patient/Family Education  Outcome: Resolved/Met Date Met:  08/15/17  Chart shows numerous feedings, void, stool WNL. Discussed Importance of monitoring outputs and feedings on first week of  Breastfeeding.   Discussed ways to tell if baby getting enough, ie  Voids and stools, by day 7, baby should have at least  4-6 wet diapers a day, change in color of stool to a seedy yellow, and return to birth wt within 2 weeks with a steady increase after that. .  Follow up with pediatrician visit for weight check in 1-2 days reviewed. Discussed Breast feeding support groups and encouraged to call warm line number, [phone] or [facility] at [phone]  for any questions/problems that arise.       Encouraged mom to attempt feeding with baby led feeding cues. Just as sucking on fingers, rooting, mouthing. Looking for 8-12 feedings in 24 hours. Don't limit baby at breast, allow baby to come of breast on it's own. Baby may want to feed  often and may increase number of feedings on second day of life. Skin to skin encouraged.        If baby doesn't nurse,  Mom should  Pump and give infant any expressed milk. If not pumping any milk, mom should contact pediatrician for possible need for supplementation.          Engorgement Care Guidelines:  Anticipatory guidance shared. Reviewed how milk is made and normal phases of milk production. Taught care of engorged breasts -and how to help. If mom should experience engorged breas frequent breastfeeding encouraged, cool packs and motrin as tolerated.   Imelda Banegas      Call your doctor, midwife and/or lactation consultant if:  · Baby is having no wet or dirty diapers   · Baby has dark colored urine after day 3  (should be pale yellow to clear)   · Baby has dark colored stools after day 4  (should be mustard yellow, with no meconium)   · Baby has fewer wet/soiled diapers or nurses less   frequently than the goals listed here   · Mom has symptoms of mastitis   (sore breast with fever, chills, flu-like aching)        Pt chooses to do both breast and bottle feeding, will follow recommendations to breastfeed first to help establish milk supply and only top off with formula, if needed, will be educated on potential consequences of early supplementation on breastfeeding success, but will be supported in decision to do both.

## 2017-08-15 NOTE — DISCHARGE INSTRUCTIONS
POST DELIVERY DISCHARGE INSTRUCTIONS    Name: Ildefonso Chang  YOB: 1985  Primary Diagnosis: Active Problems:    Pregnancy (2017)        General: Colace 100mg- take 1 Cap by mouth daily. Diet/Diet Restrictions:  Eight 8-ounce glasses of fluid daily (water, juices); avoid excessive caffeine intake. Meals/snacks as desired which are high in fiber and carbohydrates and low in fat and cholesterol. Physical Activity / Restrictions / Safety:     Avoid heavy lifting, no more that 8 lbs. For 2-3 weeks; limit use of stairs to 2 times daily for the first week home. No driving for one week. Avoid intercourse 4-6 weeks, no douching or tampon use. Check with obstetrician before starting or resuming an exercise program.         Discharge Instructions/Special Treatment/Home Care Needs:     Continue prenatal vitamins. Continue to use squirt bottle with warm water on your episiotomy after each bathroom use until bleeding stops. If steri-strips applied to your incision, remove in 7-10 days. Call your doctor for the following:     Fever over 101 degrees by mouth. Vaginal bleeding heavier than a normal menstrual period or clot larger than a golf ball. Red streaks or increased swelling of legs, painful red streaks on your breast.  Painful urination, constipation and increased pain or swelling or discharge with your incision. If you feel extremely anxious or overwhelmed. If you have thoughts of harming yourself and/or your baby. Pain Management: Motrin 800mg - take 1 tablet by mouth every 8 hours as needed for pain. Pain Management:   Take Acetaminophen (Tylenol) or Ibuprofen (Advil, Motrin), as directed for pain. Use a warm Sitz bath 3 times daily to relieve episiotomy or hemorrhoidal discomfort. Heating pad to  incision as needed. For hemorrhoidal discomfort, use Tucks and Anusol cream as needed and directed. Follow-Up Care: Call for 6 week check up appointment. These are general instructions for a healthy lifestyle:    No smoking/ No tobacco products/ Avoid exposure to second hand smoke    Surgeon General's Warning:  Quitting smoking now greatly reduces serious risk to your health. Obesity, smoking, and sedentary lifestyle greatly increases your risk for illness    A healthy diet, regular physical exercise & weight monitoring are important for maintaining a healthy lifestyle    Recognize signs and symptoms of STROKE:    F-face looks uneven    A-arms unable to move or move unevenly    S-speech slurred or non-existent    T-time-call 911 as soon as signs and symptoms begin-DO NOT go       Back to bed or wait to see if you get better-TIME IS BRAIN. Signed By: Tayla Diaz RN                                                                                                   Date: 8/15/2017 Time: 11:52 AM      After Your Delivery (the Postpartum Period): After Your Visit  Your Care Instructions  Congratulations on the birth of your baby. Like pregnancy, the  period can be a time of excitement, lachelle, and exhaustion. You may look at your wondrous little baby and feel happy. You may also be overwhelmed by your new sleep hours and new responsibilities. At first, babies often sleep during the days and are awake at night. They do not have a pattern or routine. They may make sudden gasps, jerk themselves awake, or look like they have crossed eyes. These are all normal, and they may even make you smile. In these first weeks after delivery, try to take good care of yourself. It may take 4 to 6 weeks to feel like yourself again, and possibly longer if you had a  birth. You will likely feel very tired for several weeks. Your days will be full of ups and downs, but lots of lachelle as well. Follow-up care is a key part of your treatment and safety. Be sure to make and go to all appointments, and call your doctor if you are having problems.  It's also a good idea to know your test results and keep a list of the medicines you take. How can you care for yourself at home? Take care of your body after delivery  · Use pads instead of tampons for the bloody flow that may last as long as 2 weeks. · Ease cramps with ibuprofen (Advil, Motrin). · Ease soreness of hemorrhoids and the area between your vagina and rectum with ice compresses or witch hazel pads. · Ease constipation by drinking lots of fluid and eating high-fiber foods. Ask your doctor about over-the-counter stool softeners. · Cleanse yourself with a gentle squeeze of warm water from a bottle instead of wiping with toilet paper. · Take a sitz bath in warm water several times a day. · Wear a good nursing bra. Ease sore and swollen breasts with warm, wet washcloths. · If you are not breast-feeding, use ice rather than heat for breast soreness. · Your period may not start for several months if you are breast-feeding. You may bleed more, and longer at first, than you did before you got pregnant. · Wait until you are healed (about 4 to 6 weeks) before you have sexual intercourse. Your doctor will tell you when it is okay to have sex. · Try not to travel with your baby for 5 or 6 weeks. If you take a long car trip, make frequent stops to walk around and stretch. Avoid exhaustion  · Rest every day. Try to nap when your baby naps. · Ask another adult to be with you for a few days after delivery. · Plan for  if you have other children. · Stay flexible so you can eat at odd hours and sleep when you need to. Both you and your baby are making new schedules. · Plan small trips to get out of the house. Change can make you feel less tired. · Ask for help with housework, cooking, and shopping. Remind yourself that your job is to care for your baby. Know about help for postpartum depression  · \"Baby blues\" are common for the first 1 to 2 weeks after birth.  You may cry or feel sad or irritable for no reason. · Rest whenever you can. Being tired makes it harder to handle your emotions. · Go for walks with your baby. · Talk to your partner, friends, and family about your feelings. · If your symptoms last for more than a few weeks, or if you feel very depressed, ask your doctor for help. · Postpartum depression can be treated. Support groups and counseling can help. Sometimes medicine can also help. Stay healthy  · Eat healthy foods so you have more energy, make good breast milk, and lose extra baby pounds. · If you breast-feed, avoid alcohol and drugs. Stay smoke-free. If you quit during pregnancy, congratulations. · Start daily exercise after 4 to 6 weeks, but rest when you feel tired. · Learn exercises to tone your belly. Do Kegel exercises to regain strength in your pelvic muscles. You can do these exercises while you stand or sit. ¨ Squeeze the same muscles you would use to stop your urine. Your belly and rear end (buttocks) should not move. ¨ Hold the squeeze for 3 seconds, then relax for 3 seconds. ¨ Repeat the exercise 10 to 15 times for each session. Do three or more sessions each day. · Find a class for new mothers and new babies that has an exercise time. · If you had a  birth, give yourself a bit more time before you exercise, and be careful. When should you call for help? Call 911 anytime you think you may need emergency care. For example, call if:  · You passed out (lost consciousness). Call your doctor now or seek immediate medical care if:  · You have severe vaginal bleeding. This means you are passing blood clots and soaking through a pad each hour for 2 or more hours. · You are dizzy or lightheaded, or you feel like you may faint. · You have a fever. · You have new belly pain, or your pain gets worse. Watch closely for changes in your health, and be sure to contact your doctor if:  · Your vaginal bleeding seems to be getting heavier.   · You have new or worse vaginal discharge. · You feel sad, anxious, or hopeless for more than a few days. · You do not get better as expected. Where can you learn more? Go to [employer].be  Enter A461 in the search box to learn more about \"After Your Delivery (the Postpartum Period): After Your Visit. \"   © 1763-0190 [employer]wise, Incorporated. Care instructions adapted under license by [facility] (which disclaims liability or warranty for this information). This care instruction is for use with your licensed healthcare professional. If you have questions about a medical condition or this instruction, always ask your healthcare professional. [place] [address] any warranty or liability for your use of this information. Content Version: 4.4.157916; Last Revised: 2012      C[employer]int Activation    Thank you for requesting access to [employer]. Please follow the instructions below to securely access and download your online medical record. C[employer]int allows you to send messages to your doctor, view your test results, renew your prescriptions, schedule appointments, and more. How Do I Sign Up? 1. In your internet browser, go to [email or web address]  2. Click on the First Time User? Click Here link in the Sign In box. You will be redirect to the New Member Sign Up page. 3. Enter your Cequint Access Code exactly as it appears below. You will not need to use this code after youve completed the sign-up process. If you do not sign up before the expiration date, you must request a new code. Cequint Access Code: [email or web address]  Expires: 2017 12:16 PM (This is the date your Cequint access code will )    4. Enter the last four digits of your Social Security Number (xxxx) and Date of Birth (mm/dd/yyyy) as indicated and click Submit. You will be taken to the next sign-up page. 5. Create a Cequint ID.  This will be your Cequint login ID and cannot be changed, so think of one that is secure and easy to remember. 6. Create a Br[facility]o password. You can change your password at any time. 7. Enter your Password Reset Question and Answer. This can be used at a later time if you forget your password. 8. Enter your e-mail address. You will receive e-mail notification when new information is available in [address] 9. Click Sign Up. You can now view and download portions of your medical record. 10. Click the Download Summary menu link to download a portable copy of your medical information. Additional Information    If you have questions, please visit the Frequently Asked Questions section of the Brammo website at [email or web address] Remember, Br[employer]o is NOT to be used for urgent needs. For medical emergencies, dial 911.

## 2017-08-31 NOTE — PROGRESS NOTES
RETURN OB VISIT SUMMARY    Subjective:    28 y.o.  G3   40w0d with has no unusual complaints, Denies LOF, dysuria, bleeding or cramping. Reports normal fetal movement. She istolerating her diet and is taking PNV on a regular basis. She has not noted a change in fetal position. Objective:    Physical Exam:  Visit Vitals    /72    Pulse (!) 105    Ht 5' 2\" (1.575 m)    Wt 196 lb (88.9 kg)    LMP 2016    BMI 35.85 kg/m2     Also, see prenatal vitals flowsheet. Patient without distress. Fundus: soft and non tender    Lab Results   Component Value Date/Time    HGB 11.9 2017 05:37 PM    HCT 35.3 2017 05:37 PM    PLATELET 749  05:37 PM    ABO Group O 2017 11:17 AM    Rh (D) Positive 2017 11:17 AM    Antibody screen Negative 2017 11:17 AM    Glucose - 2 hour 98 2017    Chlamydia trachomatis, BRADLEY Negative 2017 11:53 AM    Neisseria gonorrhoeae, BRADLEY Negative 2017 11:53 AM    Hep B surface Ag screen Negative 2017 11:17 AM    HIV SCREEN 4TH GENERATION WRFX Non Reactive 2017 11:17 AM    Strep Gp B BRADLEY Negative 2017 12:00 AM    Hgb, External 10.6 2017    Hct, External 31.6 2017    Platelet cnt., External 270 2017    Antibody screen, External no antibodies detected 2017       Immunization History   Administered Date(s) Administered    Influenza Vaccine (Quad) 2015    Tdap 2017     Flu Shot declined  Tdap 28-32 wks given    Assessment/Plan:    Diagnoses and all orders for this visit:    1. Encounter for supervision of high risk pregnancy in third trimester, antepartum    2. Hx of  section    3. 37 weeks gestation of pregnancy      Follow-up Disposition:  Return in about 1 week (around 2017).

## 2017-08-31 NOTE — PROGRESS NOTES
RETURN OB VISIT SUMMARY    Subjective:    28 y.o.  G3   40w0d with has no unusual complaints, Denies LOF, dysuria, bleeding or cramping. Reports normal fetal movement. She istolerating her diet and is taking PNV on a regular basis. She has not noted a change in fetal position. Objective:    Physical Exam:  Visit Vitals    /76    Pulse 84    Ht 5' 2\" (1.575 m)    Wt 202 lb (91.6 kg)    LMP 2016    BMI 36.95 kg/m2     Also, see prenatal vitals flowsheet. Patient without distress. Fundus: soft and non tender    Lab Results   Component Value Date/Time    HGB 11.9 2017 05:37 PM    HCT 35.3 2017 05:37 PM    PLATELET 006  05:37 PM    ABO Group O 2017 11:17 AM    Rh (D) Positive 2017 11:17 AM    Antibody screen Negative 2017 11:17 AM    Glucose - 2 hour 98 2017    Chlamydia trachomatis, BRADLEY Negative 2017 11:53 AM    Neisseria gonorrhoeae, BRADLEY Negative 2017 11:53 AM    Hep B surface Ag screen Negative 2017 11:17 AM    HIV SCREEN 4TH GENERATION WRFX Non Reactive 2017 11:17 AM    Strep Gp B BRADLEY Negative 2017 12:00 AM    Hgb, External 10.6 2017    Hct, External 31.6 2017    Platelet cnt., External 270 2017    Antibody screen, External no antibodies detected 2017       Immunization History   Administered Date(s) Administered    Influenza Vaccine (Quad) 2015    Tdap 2017     Flu Shot declined  Tdap 28-32 wks given    Assessment/Plan:    Diagnoses and all orders for this visit:    1. Supervision of other high risk pregnancy, antepartum, third trimester    2. Hx of  section    3. 39 weeks gestation of pregnancy      Follow-up Disposition:  Return in about 1 week (around 8/15/2017) for HROB for, previous C/S.

## 2017-08-31 NOTE — PROGRESS NOTES
RETURN OB VISIT SUMMARY    Subjective:    28 y.o.  G3   40w0d with has no unusual complaints, Denies LOF, dysuria, bleeding or cramping. Reports normal fetal movement. She istolerating her diet and is taking PNV on a regular basis. She has not noted a change in fetal position. Objective:    Physical Exam:  Visit Vitals    /72    Pulse 99    Ht 5' 2\" (1.575 m)    Wt 197 lb 6.4 oz (89.5 kg)    LMP 2016    BMI 36.1 kg/m2     Also, see prenatal vitals flowsheet. Patient without distress. Fundus: soft and non tender    Lab Results   Component Value Date/Time    HGB 11.9 2017 05:37 PM    HCT 35.3 2017 05:37 PM    PLATELET 069  05:37 PM    ABO Group O 2017 11:17 AM    Rh (D) Positive 2017 11:17 AM    Antibody screen Negative 2017 11:17 AM    Glucose - 2 hour 98 2017    Chlamydia trachomatis, BRADLEY Negative 2017 11:53 AM    Neisseria gonorrhoeae, BRADLEY Negative 2017 11:53 AM    Hep B surface Ag screen Negative 2017 11:17 AM    HIV SCREEN 4TH GENERATION WRFX Non Reactive 2017 11:17 AM    Strep Gp B BRADLEY Negative 2017 12:00 AM    Hgb, External 10.6 2017    Hct, External 31.6 2017    Platelet cnt., External 270 2017    Antibody screen, External no antibodies detected 2017       Immunization History   Administered Date(s) Administered    Influenza Vaccine (Quad) 2015    Tdap 2017     Flu Shot declined  Tdap 28-32 wks given    Assessment/Plan:    Diagnoses and all orders for this visit:    1. Supervision of other high risk pregnancy, antepartum, third trimester    2. Hx of  section    3. 38 weeks gestation of pregnancy      Follow-up Disposition:  Return in about 1 week (around 2017) for HROB for, previous C/S.

## 2017-09-26 ENCOUNTER — OFFICE VISIT (OUTPATIENT)
Dept: MIDWIFE SERVICES | Age: 32
End: 2017-09-26

## 2017-09-26 VITALS
SYSTOLIC BLOOD PRESSURE: 111 MMHG | DIASTOLIC BLOOD PRESSURE: 74 MMHG | HEART RATE: 83 BPM | WEIGHT: 179 LBS | BODY MASS INDEX: 32.94 KG/M2 | HEIGHT: 62 IN

## 2017-09-26 PROBLEM — O09.899 SUPERVISION OF OTHER HIGH RISK PREGNANCY, ANTEPARTUM: Status: RESOLVED | Noted: 2017-02-06 | Resolved: 2017-09-26

## 2017-09-26 PROBLEM — Z34.90 PREGNANCY: Status: RESOLVED | Noted: 2017-08-13 | Resolved: 2017-09-26

## 2017-09-26 NOTE — PROGRESS NOTES
Chief Complaint   Patient presents with   [address]     6 weeks   [address] birth control     Visit Vitals    /74    Pulse 83    Ht 5' 2\" (1.575 m)    Wt 179 lb (81.2 kg)    Breastfeeding Yes    BMI 32.74 kg/m2     1. Have you been to the ER, urgent care clinic since your last visit? Hospitalized since your last visit? No    2. Have you seen or consulted any other health care providers outside of the [address] [place] [address] since your last visit? Include any pap smears or colon screening. No     Here today for post partum care.

## 2017-09-26 NOTE — PROGRESS NOTES
POST PARTUM NOTE    Subjective:   Ms. Meggan Powell is a 28 y.o. G3 [phone]  who is now 6 weeks postpartum. Method of delivery:   Laceration:  none  She is breast-feeding and is not experiencing problems. She has not resumed intercourse. She is not having issues with resumption of sexual relations. Pregnancy complications: prior CS. She is feeling well. [place]  Depression Scale = not done  She currently uses abstinence for contraception. She plans to use condoms for contraception. 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  ascorbic acid, vitamin C, (VITAMIN C) 500 mg tablet Take  by mouth.  cholecalciferol, vitamin D3, (VITAMIN D3) 2,000 unit tab Take  by mouth.  prenatal vit-calcium-iron-fa (PRENATAL PLUS WITH CALCIUM) 27 mg iron- 1 mg tab Take 1 Tab by mouth daily. 90 Tab 4     No Known Allergies     Objective:   Physical Exam:  Date of last Pap smear: 2015  Physical Exam: GENERAL APPEARANCE: alert, well appearing, in no apparent distress  THYROID: no thyromegaly or masses present  LUNGS: clear to auscultation, no wheezes, rales or rhonchi, symmetric air entry  HEART: regular rate and rhythm, no murmurs  ABDOMEN POSTPARTUM: benign non-tender, without masses or organomegaly palpable  EXTERNAL GENITALIA POSTPARTUM: normal, well-healed, without lesions or masses  VAGINA POSTPARTUM: normal, well-healed, physiologic discharge, without lesions    Assessment/Plan:   normal postpartum exam     Diagnoses and all orders for this visit:    1.  Encounter for postpartum care after hospital delivery      Follow-up Disposition:  Return in about 1 year (around 2018) for Annual exam.

## 2023-03-27 NOTE — PATIENT INSTRUCTIONS
Thank you for choosing [facility]. We know you have many options when it comes to your healthcare; we appreciate that you picked us. Our goal is to provide exceptional service and world class care for every patient. You may receive a survey in the mail or by email asking for your feedback. Please take a few minutes to share your thoughts about your recent visit. Your comments helps us understand what we do well and what we can do better. To ensure confidentiality, this survey is administered by an independent third-party, [address] [place] [address] participation will help us to continue and improve the quality of care that we provide to you, your family, friends, and neighbors. Thank you! Weeks 34 to 36 of Your Pregnancy: Care Instructions  Your Care Instructions    By now, your baby and your belly have grown quite large. It is almost time to give birth. A full-term pregnancy can deliver between 37 and 42 weeks. Your baby's lungs are almost ready to breathe air. The bones in your baby's head are now firm enough to protect it, but soft enough to move down through the birth canal.  You may feel excited, happy, anxious, or scared. You may wonder how you will know if you are in labor or what to expect during labor. Try to be flexible in your expectations of the birth. Because each birth is different, there is no way to know exactly what childbirth will be like for you. This care sheet will help you know what to expect and how to prepare. This may make your childbirth easier. If you haven't already had the Tdap shot during this pregnancy, talk to your doctor about getting it. It will help protect your  against pertussis infection. In the 36th week, most women have a test for group B streptococcus (GBS). GBS is a common bacteria that can live in the vagina and rectum. It can make your baby sick after birth. If you test positive, you will get antibiotics during labor.  The 115 medicine will keep your baby from getting the bacteria. Follow-up care is a key part of your treatment and safety. Be sure to make and go to all appointments, and call your doctor if you are having problems. It's also a good idea to know your test results and keep a list of the medicines you take. How can you care for yourself at home? Learn about pain relief choices  · Pain is different for every woman. Talk with your doctor about your feelings about pain. · You can choose from several types of pain relief. These include medicine or breathing techniques, as well as comfort measures. You can use more than one option. · If you choose to have pain medicine during labor, talk to your doctor about your options. Some medicines lower anxiety and help with some of the pain. Others make your lower body numb so that you won't feel pain. · Be sure to tell your doctor about your pain medicine choice before you start labor or very early in your labor. You may be able to change your mind as labor progresses. · Rarely, a woman is put to sleep by medicine given through a mask or an IV. Labor and delivery  · The first stage of labor has three parts: early, active, and transition. ¨ Most women have early labor at home. You can stay busy or rest, eat light snacks, drink clear fluids, and start counting contractions. ¨ When talking during a contraction gets hard, you may be moving to active labor. During active labor, you should head for the hospital if you are not there already. ¨ You are in active labor when contractions come every 3 to 4 minutes and last about 60 seconds. Your cervix is opening more rapidly. ¨ If your water breaks, contractions will come faster and stronger. ¨ During transition, your cervix is stretching, and contractions are coming more rapidly. ¨ You may want to push, but your cervix might not be ready. Your doctor will tell you when to push.   · The second stage starts when your cervix is completely opened and you are ready to push. ¨ Contractions are very strong to push the baby down the birth canal.  ¨ You will feel the urge to push. You may feel like you need to have a bowel movement. ¨ You may be coached to push with contractions. These contractions will be very strong, but you will not have them as often. You can get a little rest between contractions. ¨ You may be emotional and irritable. You may not be aware of what is going on around you. ¨ One last push, and your baby is born. · The third stage is when a few more contractions push out the placenta. This may take 30 minutes or less. · The fourth stage is the welcome recovery. You may feel overwhelmed with emotions and exhausted but alert. This is a good time to start breastfeeding. Where can you learn more? Go to http://addison-[employer]me.info/. Enter P306 in the search box to learn more about \"Weeks 34 to 36 of Your Pregnancy: Care Instructions. \"  Current as of: March 16, 2017  Content Version: 11.3  © 2031-5006 [employer]. Care instructions adapted under license by [employer] (which disclaims liability or warranty for this information). If you have questions about a medical condition or this instruction, always ask your healthcare professional. [address] any warranty or liability for your use of this information.